# Patient Record
Sex: FEMALE | Race: BLACK OR AFRICAN AMERICAN | NOT HISPANIC OR LATINO | Employment: UNEMPLOYED | URBAN - METROPOLITAN AREA
[De-identification: names, ages, dates, MRNs, and addresses within clinical notes are randomized per-mention and may not be internally consistent; named-entity substitution may affect disease eponyms.]

---

## 2020-01-22 DIAGNOSIS — O09.522 MULTIGRAVIDA OF ADVANCED MATERNAL AGE IN SECOND TRIMESTER: Primary | ICD-10-CM

## 2020-01-30 ENCOUNTER — INITIAL CONSULT (OUTPATIENT)
Dept: MATERNAL FETAL MEDICINE | Facility: CLINIC | Age: 40
End: 2020-01-30
Payer: MEDICAID

## 2020-01-30 VITALS
RESPIRATION RATE: 20 BRPM | BODY MASS INDEX: 22.44 KG/M2 | OXYGEN SATURATION: 97 % | HEART RATE: 84 BPM | SYSTOLIC BLOOD PRESSURE: 120 MMHG | HEIGHT: 67 IN | DIASTOLIC BLOOD PRESSURE: 74 MMHG | WEIGHT: 143 LBS

## 2020-01-30 DIAGNOSIS — O09.522 MULTIGRAVIDA OF ADVANCED MATERNAL AGE IN SECOND TRIMESTER: ICD-10-CM

## 2020-01-30 PROCEDURE — 76811 OB US DETAILED SNGL FETUS: CPT | Mod: S$GLB,,, | Performed by: OBSTETRICS & GYNECOLOGY

## 2020-01-30 PROCEDURE — 99203 PR OFFICE/OUTPT VISIT, NEW, LEVL III, 30-44 MIN: ICD-10-PCS | Mod: TH,25,S$GLB, | Performed by: OBSTETRICS & GYNECOLOGY

## 2020-01-30 PROCEDURE — 76811 PR US, OB FETAL EVAL & EXAM, TRANSABDOM,FIRST GESTATION: ICD-10-PCS | Mod: S$GLB,,, | Performed by: OBSTETRICS & GYNECOLOGY

## 2020-01-30 PROCEDURE — 99203 OFFICE O/P NEW LOW 30 MIN: CPT | Mod: TH,25,S$GLB, | Performed by: OBSTETRICS & GYNECOLOGY

## 2020-01-30 RX ORDER — PROMETHAZINE HYDROCHLORIDE 25 MG/1
TABLET ORAL
COMMUNITY
Start: 2019-11-15 | End: 2023-07-10

## 2020-01-30 RX ORDER — FERROUS SULFATE 325(65) MG
TABLET, DELAYED RELEASE (ENTERIC COATED) ORAL
COMMUNITY
Start: 2019-11-27

## 2020-01-30 RX ORDER — B-COMPLEX WITH VITAMIN C
TABLET ORAL
COMMUNITY
Start: 2019-11-03 | End: 2023-07-10

## 2020-01-30 NOTE — PROGRESS NOTES
"Christ is here for initial M consultation, referred by Dr. Ortega for AMA.    She is feeling fetal movement.    Christ denies loss of fluid, recurrent cramping, nausea or vomiting. She did go to the hospital 4 days ago for vaginal bleeding and reports that "everything was fine."      Vitals:    01/30/20 0938   BP: 120/74   Pulse: 84   Resp: 20   SpO2: 97%   Weight: 64.9 kg (143 lb)   Height: 5' 7" (1.702 m)      BMI:                    22.4 kg/m^2             "

## 2020-01-30 NOTE — LETTER
January 30, 2020        Walker Ortega MD  [address] [facility] [address]             [facility]  [address]  [place] [address]  Phone: [phone]  Fax: [phone]   Patient: Christ Arambula   MR Number: [id number]   YOB: 1980   Date of Visit: 1/30/2020       Dear Dr. Ortega:    Thank you for referring Christ Arambula to me for evaluation. Attached you will find relevant portions of my assessment and plan of care.    If you have questions, please do not hesitate to call me. I look forward to following Christ Arambula along with you.    Sincerely,      Pernell Abad MD        CC  No Recipients    Enclosure

## 2020-01-30 NOTE — PROGRESS NOTES
Indication for consultation:  Advanced maternal age    Provider requesting consultation: Dr. Ortega    Dear Dr. Ortega,    I had the pleasure of seeing Christ Arambula for initial consultation today.  As you recall she is a 39 y.o.  at 18w0d here for consultation regarding advanced maternal age.    Of note the patient has completed quad screening with your office.  Her age related risk of Down syndrome is 1 in 100 fortunately her quad screening notes her risk to be much less at 1 in 2937.  Her quad screen does not show any increased risk of trisomy 18 or open neural tube defect.    PMH:  The patient has a history of asthma.  She is currently taking no medications for this diagnosis.  She also has remote history of pancreatitis in 2017.    Ob Hx:  This is the patient's 4th pregnancy. Her 1st pregnancy was a vaginal delivery at 39 weeks of a 6 lb 4 oz female  in .  Her 2nd pregnancy was a successful vaginal delivery at 39 weeks of a 6 lb 9 oz female  in .  Her 3rd pregnancy was a first-trimester miscarriage in .    PSH:  She denies any prior surgeries.    SOC:  The patient has a known history of tobacco and marijuana use.  According to the patient she is trying to decrease her tobacco usage.  She has not smoked marijuana in over a month.  She denies any alcohol use in pregnancy.    Medications:  Phenergan 25 mg as needed for nausea vomiting.    Family hx:  The patient has a father who  from Luz Maria's disease.  There is also a family history of schizophrenia.    Allergies:  She is allergic to Cheratussin AC    Review of systems: The patient denies any vaginal bleeding, loss of fluid or contraction pain today.  Vitals:    20 0938   BP: 120/74   Pulse: 84   Resp: 20   Weight: 143lb    Physical exam:  Gen: WDWN in NAD  HEENT: WNL  Abdomen: Soft, non-tender  Skin: No rash or jaundice  Extremities: No clubbing or cyanosis  Neuro: Grossly intact    Ultrasound:  A detailed fetal  anatomical survey was completed today.  There is a single intrauterine pregnancy in cephalic presentation.  Structurally the fetus does not have any evidence of fetal aneuploidy.  The fetal growth is reassuring.  The amniotic fluid volume appears to be normal. The cervix appears to be of normal length.  Placenta does not show any evidence of previa.  There is a 6 cm fibroid in the left lower quadrant that does not show any evidence pelvic obstruction.  Please see official report for further specifics.    Recommendations:  Today I discussed with the patient the increased risk of fetal aneuploidy with advancing maternal age.  I reviewed with the patient the results of her ultrasound along with the results of her quad screening.  I discussed with the patient the limitations of ultrasound in the diagnosis of fetal aneuploidy.  I also reviewed with the patient the definitive diagnostic option of amniocentesis, including its fetal loss rate of 1 in 500.  At the end of our conversation today I did not recommend amniocentesis since her ultrasound appears normal and her quad screening is negative.    I also discussed with the patient the finding of an intramural fibroid.  These are quite common in pregnancy as you know.  Occasionally we will have a fibroid that will began to have degeneration secondary to rapid growth.  In those women experiencing pain from the fibroid firm rapid growth/degeneration I do recommend a course ibuprofen prior to 32 weeks.  Beyond 32 weeks I would treat degenerating fibroids with just Tylenol and occasionally opioids.      After today's visit since everything appears normal on fetal ultrasound I did not schedule any further follow-up ultrasounds.    Thanks once again for allowing us to participate in the care of your patients.  If you have any questions about today's consultation feel free to contact me or my partners at [phone].    Sincerely,

## 2023-03-24 ENCOUNTER — HOSPITAL ENCOUNTER (EMERGENCY)
Facility: HOSPITAL | Age: 43
Discharge: HOME | End: 2023-03-24
Attending: FAMILY MEDICINE

## 2023-03-24 VITALS
RESPIRATION RATE: 16 BRPM | HEIGHT: 67 IN | TEMPERATURE: 98 F | HEART RATE: 83 BPM | OXYGEN SATURATION: 100 % | WEIGHT: 130.06 LBS | BODY MASS INDEX: 20.41 KG/M2 | SYSTOLIC BLOOD PRESSURE: 163 MMHG | DIASTOLIC BLOOD PRESSURE: 91 MMHG

## 2023-03-24 DIAGNOSIS — K08.89 PAIN, DENTAL: Primary | ICD-10-CM

## 2023-03-24 PROCEDURE — 64400 NJX AA&/STRD TRIGEMINAL NRV: CPT

## 2023-03-24 PROCEDURE — 99284 EMERGENCY DEPT VISIT MOD MDM: CPT

## 2023-03-24 NOTE — ED PROVIDER NOTES
Name: Christ Arambula   Age: 42 y.o.  Sex: female    Chief complaint:   Chief Complaint   Patient presents with    Dental Pain     PT CO DENTAL PAIN X 3 DAYS. STARTED RX ANTIBIOTIC YESTERDAY, PAIN MEDS NOT HELPING.       Patient arrived with: Private  History obtained from: Patient    Subjective:     42-year-old female with a history of asthma and pancreatitis that presents to emergency department for dental pain.  Patient said she is had dental pain that has been going on for few days.  She saw a dentist yesterday and was told she had periodontal disease and signs of an abscess.  She was sent home with amoxicillin and tramadol.   They have a follow-up appointment scheduled with the dentist on April 12th for possible root canal.  Had worsening pain overnight which is why she came into the emergency department.  Tolerating p.o. intake.  Denies fever, chills, sweats, cough, sore throat, chest pain, shortness of breath, abdominal pain, vomiting, diarrhea, urinary symptoms.    Past Medical History:   Diagnosis Date    Asthma     Pancreatitis 2017     Past Surgical History:   Procedure Laterality Date    NO PAST SURGERIES       Social History     Socioeconomic History    Marital status: Single   Tobacco Use    Smoking status: Some Days    Tobacco comments:     occasional cigarette   Substance and Sexual Activity    Alcohol use: Not Currently    Drug use: Not Currently     Types: Marijuana     Comment: last used early Jan 2020    Sexual activity: Yes     Partners: Male   Social History Narrative    ** Merged History Encounter **          Review of patient's allergies indicates:   Allergen Reactions    Cheratussin ac [codeine-guaifenesin] Itching        Review of Systems   Constitutional:  Negative for diaphoresis and fever.   HENT:  Negative for congestion and sore throat.    Eyes:  Negative for pain and discharge.   Respiratory:  Negative for cough and shortness of breath.    Cardiovascular:  Negative for chest pain  and palpitations.   Gastrointestinal:  Negative for diarrhea and vomiting.   Genitourinary:  Negative for dysuria and hematuria.   Musculoskeletal:  Negative for back pain and myalgias.   Skin:  Negative for itching and rash.   Neurological:  Negative for weakness and headaches.        Objective:     Vitals:    03/24/23 0752   BP: (!) 163/91   Pulse: 83   Resp: 16   Temp: 97.9 °F (36.6 °C)        Physical Exam  Constitutional:       Appearance: She is not toxic-appearing.   HENT:      Head: Normocephalic and atraumatic.      Mouth/Throat:      Mouth: Mucous membranes are dry.      Dentition: Abnormal dentition. Dental caries present. No dental tenderness.   Cardiovascular:      Rate and Rhythm: Regular rhythm.      Pulses: Normal pulses.   Pulmonary:      Effort: Pulmonary effort is normal.      Breath sounds: Normal breath sounds.   Abdominal:      General: Abdomen is flat. Bowel sounds are normal.      Palpations: Abdomen is soft.   Musculoskeletal:         General: No deformity. Normal range of motion.      Cervical back: Normal range of motion and neck supple.   Skin:     General: Skin is warm and dry.   Neurological:      General: No focal deficit present.      Mental Status: She is alert and oriented to person, place, and time.   Psychiatric:         Mood and Affect: Mood normal.         Behavior: Behavior normal.        Records:  Nursing records and triage records reviewed  Prior records reviewed    Medical decision making:     Presents to emergency department for dental pain, is already seeing a dentist at this time and on antibiotics.  Patient is nontoxic appearing.  Physical exam showed no acute findings.  Dental block was performed and patient's pain improved.  She will continue follow-up with a dentist for re-evaluation.  We discussed pain management.  We discussed return precautions they are listed in the discharge instructions.  Patient understands the plan, no further questions, felt safe for  discharge.            EKG:       Nerve Block    Date/Time: 3/24/2023 8:32 AM  Location procedure was performed: [facility] EMERGENCY DEPARTMENT  Performed by: Dave Crawley MD  Authorized by: Dave Crawley MD   Consent Done: Yes  Consent: Verbal consent obtained.  Consent given by: patient  Patient understanding: patient states understanding of the procedure being performed  Indications: pain relief  Body area: face/mouth  Nerve: inferior alveolar  Laterality: left  Location technique: anatomical landmarks  Local Anesthetic: bupivacaine 0.5% with epinephrine  Anesthetic total: 1.8 mL  Complications: No  Specimens: No  Implants: No  Outcome: pain improved  Patient tolerance: Patient tolerated the procedure well with no immediate complications           Diagnosis:  Final diagnoses:  [K08.89] Pain, dental (Primary)     ED Prescriptions    None       Follow-up Information       Follow up With Specialties Details Why Contact Info    Dentist  Call  As soon as possible             Dave Crawley M.D.  Emergency Medicine Physician     (Please note that this chart was completed via voice to text dictation. There may be typographical errors or substitutions that are unintentional, or uncorrected. Every attempt was made to proofread the chart prior to completion. If there are any questions, please contact the physician for final clarification).         Dave Crawley MD  03/24/23 0896

## 2023-03-24 NOTE — DISCHARGE INSTRUCTIONS
You came into the emergency department for dental pain.    We did a nerve block that help you with your dental pain.  Continue taking you antibiotics.      You can take Tylenol and ibuprofen for pain control.      Please follow-up with the dentist for re-evaluation as scheduled.    Please note that a dentist is the only one that can take care of your current issues and will be really important for you to see them as soon as possible.    Return to the emergency department he started to have fevers that are not under control, not able tolerate any food/water, start to have worsening swelling of your gums or face, start to notice pus-like discharge.

## 2023-03-24 NOTE — Clinical Note
"Christ Abdul (Shalania) was seen and treated in our emergency department on 3/24/2023.  She may return to school on 03/24/2023.  Socorro abdul was in ER this am.     If you have any questions or concerns, please don't hesitate to call.       RN"

## 2023-05-24 ENCOUNTER — HOSPITAL ENCOUNTER (EMERGENCY)
Facility: HOSPITAL | Age: 43
Discharge: HOME | End: 2023-05-24
Attending: STUDENT IN AN ORGANIZED HEALTH CARE EDUCATION/TRAINING PROGRAM
Payer: MEDICAID

## 2023-05-24 VITALS
HEART RATE: 77 BPM | SYSTOLIC BLOOD PRESSURE: 147 MMHG | WEIGHT: 127.88 LBS | RESPIRATION RATE: 20 BRPM | BODY MASS INDEX: 20.07 KG/M2 | DIASTOLIC BLOOD PRESSURE: 83 MMHG | TEMPERATURE: 97 F | HEIGHT: 67 IN | OXYGEN SATURATION: 100 %

## 2023-05-24 DIAGNOSIS — Z76.0 MEDICATION REFILL: ICD-10-CM

## 2023-05-24 DIAGNOSIS — M54.41 CHRONIC RIGHT-SIDED LOW BACK PAIN WITH RIGHT-SIDED SCIATICA: Primary | ICD-10-CM

## 2023-05-24 DIAGNOSIS — G89.29 CHRONIC RIGHT-SIDED LOW BACK PAIN WITH RIGHT-SIDED SCIATICA: Primary | ICD-10-CM

## 2023-05-24 LAB
B-HCG UR QL: NEGATIVE
CTP QC/QA: YES

## 2023-05-24 PROCEDURE — 81025 URINE PREGNANCY TEST: CPT | Performed by: NURSE PRACTITIONER

## 2023-05-24 PROCEDURE — 63600175 PHARM REV CODE 636 W HCPCS: Performed by: NURSE PRACTITIONER

## 2023-05-24 PROCEDURE — 96372 THER/PROPH/DIAG INJ SC/IM: CPT | Performed by: NURSE PRACTITIONER

## 2023-05-24 PROCEDURE — 99284 EMERGENCY DEPT VISIT MOD MDM: CPT

## 2023-05-24 RX ORDER — CYPROHEPTADINE HYDROCHLORIDE 4 MG/1
4 TABLET ORAL 2 TIMES DAILY
Qty: 60 TABLET | Refills: 0 | Status: SHIPPED | OUTPATIENT
Start: 2023-05-24 | End: 2023-07-10 | Stop reason: SDUPTHER

## 2023-05-24 RX ORDER — DICLOFENAC SODIUM 75 MG/1
75 TABLET, DELAYED RELEASE ORAL 2 TIMES DAILY PRN
Qty: 20 TABLET | Refills: 0 | Status: SHIPPED | OUTPATIENT
Start: 2023-05-24 | End: 2023-07-10

## 2023-05-24 RX ORDER — KETOROLAC TROMETHAMINE 30 MG/ML
30 INJECTION, SOLUTION INTRAMUSCULAR; INTRAVENOUS
Status: COMPLETED | OUTPATIENT
Start: 2023-05-24 | End: 2023-05-24

## 2023-05-24 RX ORDER — TIZANIDINE 4 MG/1
8 TABLET ORAL EVERY 6 HOURS PRN
Qty: 30 TABLET | Refills: 0 | Status: SHIPPED | OUTPATIENT
Start: 2023-05-24 | End: 2023-06-03

## 2023-05-24 RX ADMIN — KETOROLAC TROMETHAMINE 30 MG: 30 INJECTION, SOLUTION INTRAMUSCULAR; INTRAVENOUS at 09:05
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
## 2023-07-10 ENCOUNTER — PATIENT MESSAGE (OUTPATIENT)
Dept: ADMINISTRATIVE | Facility: HOSPITAL | Age: 43
End: 2023-07-10
Payer: MEDICAID

## 2023-07-10 ENCOUNTER — OFFICE VISIT (OUTPATIENT)
Dept: INTERNAL MEDICINE | Facility: CLINIC | Age: 43
End: 2023-07-10
Payer: MEDICAID

## 2023-07-10 VITALS
TEMPERATURE: 99 F | RESPIRATION RATE: 20 BRPM | HEART RATE: 60 BPM | HEIGHT: 67 IN | BODY MASS INDEX: 22.07 KG/M2 | SYSTOLIC BLOOD PRESSURE: 128 MMHG | DIASTOLIC BLOOD PRESSURE: 80 MMHG | WEIGHT: 140.63 LBS

## 2023-07-10 DIAGNOSIS — F41.9 ANXIETY AND DEPRESSION: ICD-10-CM

## 2023-07-10 DIAGNOSIS — F17.200 SMOKER: ICD-10-CM

## 2023-07-10 DIAGNOSIS — Z00.00 WELLNESS EXAMINATION: Primary | ICD-10-CM

## 2023-07-10 DIAGNOSIS — R51.9 NONINTRACTABLE HEADACHE, UNSPECIFIED CHRONICITY PATTERN, UNSPECIFIED HEADACHE TYPE: ICD-10-CM

## 2023-07-10 DIAGNOSIS — R63.0 DECREASED APPETITE: ICD-10-CM

## 2023-07-10 DIAGNOSIS — Z82.0 FAMILY HISTORY OF HUNTINGTON'S DISEASE: ICD-10-CM

## 2023-07-10 DIAGNOSIS — J45.909 ASTHMA, UNSPECIFIED ASTHMA SEVERITY, UNSPECIFIED WHETHER COMPLICATED, UNSPECIFIED WHETHER PERSISTENT: ICD-10-CM

## 2023-07-10 DIAGNOSIS — F32.A ANXIETY AND DEPRESSION: ICD-10-CM

## 2023-07-10 DIAGNOSIS — Z11.3 ROUTINE SCREENING FOR STI (SEXUALLY TRANSMITTED INFECTION): ICD-10-CM

## 2023-07-10 DIAGNOSIS — N89.8 VAGINAL DISCHARGE: ICD-10-CM

## 2023-07-10 DIAGNOSIS — Z13.1 SCREENING FOR DIABETES MELLITUS: ICD-10-CM

## 2023-07-10 DIAGNOSIS — Z12.31 ENCOUNTER FOR SCREENING MAMMOGRAM FOR MALIGNANT NEOPLASM OF BREAST: ICD-10-CM

## 2023-07-10 LAB
CLUE CELLS VAG QL WET PREP: NORMAL
T VAGINALIS VAG QL WET PREP: NORMAL
WBC #/AREA VAG WET PREP: NORMAL
YEAST SPEC QL WET PREP: NORMAL

## 2023-07-10 PROCEDURE — 3008F PR BODY MASS INDEX (BMI) DOCUMENTED: ICD-10-PCS | Mod: CPTII,,, | Performed by: NURSE PRACTITIONER

## 2023-07-10 PROCEDURE — 99406 BEHAV CHNG SMOKING 3-10 MIN: CPT | Mod: S$PBB,,, | Performed by: NURSE PRACTITIONER

## 2023-07-10 PROCEDURE — 3074F SYST BP LT 130 MM HG: CPT | Mod: CPTII,,, | Performed by: NURSE PRACTITIONER

## 2023-07-10 PROCEDURE — 99215 OFFICE O/P EST HI 40 MIN: CPT | Mod: PBBFAC | Performed by: NURSE PRACTITIONER

## 2023-07-10 PROCEDURE — 3008F BODY MASS INDEX DOCD: CPT | Mod: CPTII,,, | Performed by: NURSE PRACTITIONER

## 2023-07-10 PROCEDURE — 3074F PR MOST RECENT SYSTOLIC BLOOD PRESSURE < 130 MM HG: ICD-10-PCS | Mod: CPTII,,, | Performed by: NURSE PRACTITIONER

## 2023-07-10 PROCEDURE — 99205 OFFICE O/P NEW HI 60 MIN: CPT | Mod: S$PBB,25,, | Performed by: NURSE PRACTITIONER

## 2023-07-10 PROCEDURE — 99406 PR TOBACCO USE CESSATION INTERMEDIATE 3-10 MINUTES: ICD-10-PCS | Mod: S$PBB,,, | Performed by: NURSE PRACTITIONER

## 2023-07-10 PROCEDURE — 87210 SMEAR WET MOUNT SALINE/INK: CPT | Performed by: NURSE PRACTITIONER

## 2023-07-10 PROCEDURE — 1159F PR MEDICATION LIST DOCUMENTED IN MEDICAL RECORD: ICD-10-PCS | Mod: CPTII,,, | Performed by: NURSE PRACTITIONER

## 2023-07-10 PROCEDURE — 1160F PR REVIEW ALL MEDS BY PRESCRIBER/CLIN PHARMACIST DOCUMENTED: ICD-10-PCS | Mod: CPTII,,, | Performed by: NURSE PRACTITIONER

## 2023-07-10 PROCEDURE — 3079F DIAST BP 80-89 MM HG: CPT | Mod: CPTII,,, | Performed by: NURSE PRACTITIONER

## 2023-07-10 PROCEDURE — 99205 PR OFFICE/OUTPT VISIT, NEW, LEVL V, 60-74 MIN: ICD-10-PCS | Mod: S$PBB,25,, | Performed by: NURSE PRACTITIONER

## 2023-07-10 PROCEDURE — 1160F RVW MEDS BY RX/DR IN RCRD: CPT | Mod: CPTII,,, | Performed by: NURSE PRACTITIONER

## 2023-07-10 PROCEDURE — 3079F PR MOST RECENT DIASTOLIC BLOOD PRESSURE 80-89 MM HG: ICD-10-PCS | Mod: CPTII,,, | Performed by: NURSE PRACTITIONER

## 2023-07-10 PROCEDURE — 1159F MED LIST DOCD IN RCRD: CPT | Mod: CPTII,,, | Performed by: NURSE PRACTITIONER

## 2023-07-10 RX ORDER — CYPROHEPTADINE HYDROCHLORIDE 4 MG/1
4 TABLET ORAL 3 TIMES DAILY PRN
COMMUNITY
End: 2023-07-10 | Stop reason: SDUPTHER

## 2023-07-10 RX ORDER — CYPROHEPTADINE HYDROCHLORIDE 4 MG/1
4 TABLET ORAL 3 TIMES DAILY PRN
Qty: 90 TABLET | Refills: 1 | Status: SHIPPED | OUTPATIENT
Start: 2023-07-10 | End: 2024-03-05 | Stop reason: SDUPTHER

## 2023-07-10 RX ORDER — MULTIVITAMIN
1 TABLET ORAL DAILY
COMMUNITY
End: 2023-08-21

## 2023-07-10 RX ORDER — ALBUTEROL SULFATE 90 UG/1
2 AEROSOL, METERED RESPIRATORY (INHALATION) EVERY 6 HOURS PRN
Qty: 18 G | Refills: 0 | Status: SHIPPED | OUTPATIENT
Start: 2023-07-10 | End: 2024-07-09

## 2023-07-10 NOTE — ASSESSMENT & PLAN NOTE
1. Drink 6-8 glasses of water/day  2. Wipe from front to back after urinating  3. Avoid use of scented soaps, lotions, perfumes, etc in vaginal region  4. Practice safe sex
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
## 2023-07-10 NOTE — TELEPHONE ENCOUNTER
Please inform of results:  WBC, Wet Prep None Seen None Seen    Clue Cells, Wet Prep None Seen None Seen    Trichomonas, Wet Prep None Seen None Seen    Yeast, Wet Prep None Seen None Seen       Latest Reference Range & Units 07/10/23 12:05   Chlamydia trachomatis PCR Not Detected  Not Detected   N. gonorrhea PCR Not Detected  Not Detected      Latest Reference Range & Units 07/10/23 11:53   Hepatitis C Ab Nonreactive  Nonreactive   HIV Nonreactive  Nonreactive   Syphilis Antibody Nonreactive, Equivocal  Nonreactive     So far, testing negative. Discharge could be due to very recent menses.     I'd advise:  1. Drink 6-8 glasses of water/day  2. Wipe from front to back after urinating  3. Avoid use of scented soaps, lotions, perfumes, etc in vaginal region    If continues, have her schedule an appt.

## 2023-07-10 NOTE — ASSESSMENT & PLAN NOTE
States intermittent since orbital fx in 2016. Recently missed an appt with a  Neurologist. Advised to f/u ASAP

## 2023-07-21 ENCOUNTER — HOSPITAL ENCOUNTER (OUTPATIENT)
Dept: RADIOLOGY | Facility: HOSPITAL | Age: 43
Discharge: HOME | End: 2023-07-21
Attending: NURSE PRACTITIONER
Payer: MEDICAID

## 2023-07-21 DIAGNOSIS — Z12.31 ENCOUNTER FOR SCREENING MAMMOGRAM FOR MALIGNANT NEOPLASM OF BREAST: ICD-10-CM

## 2023-07-21 PROCEDURE — 77063 MAMMO DIGITAL SCREENING BILAT WITH TOMO: ICD-10-PCS | Mod: 26,,, | Performed by: RADIOLOGY

## 2023-07-21 PROCEDURE — 77067 MAMMO DIGITAL SCREENING BILAT WITH TOMO: ICD-10-PCS | Mod: 26,,, | Performed by: RADIOLOGY

## 2023-07-21 PROCEDURE — 77063 BREAST TOMOSYNTHESIS BI: CPT | Mod: 26,,, | Performed by: RADIOLOGY

## 2023-07-21 PROCEDURE — 77067 SCR MAMMO BI INCL CAD: CPT | Mod: 26,,, | Performed by: RADIOLOGY

## 2023-07-21 PROCEDURE — 77067 SCR MAMMO BI INCL CAD: CPT | Mod: TC

## 2023-08-02 ENCOUNTER — HOSPITAL ENCOUNTER (EMERGENCY)
Facility: HOSPITAL | Age: 43
Discharge: HOME | End: 2023-08-02
Attending: STUDENT IN AN ORGANIZED HEALTH CARE EDUCATION/TRAINING PROGRAM
Payer: MEDICAID

## 2023-08-02 VITALS
DIASTOLIC BLOOD PRESSURE: 77 MMHG | BODY MASS INDEX: 22.1 KG/M2 | HEART RATE: 65 BPM | OXYGEN SATURATION: 100 % | TEMPERATURE: 99 F | WEIGHT: 141.13 LBS | RESPIRATION RATE: 24 BRPM | SYSTOLIC BLOOD PRESSURE: 118 MMHG

## 2023-08-02 DIAGNOSIS — R05.9 COUGH: ICD-10-CM

## 2023-08-02 DIAGNOSIS — R42 POSTURAL DIZZINESS WITH PRESYNCOPE: ICD-10-CM

## 2023-08-02 DIAGNOSIS — R07.9 CHEST PAIN: ICD-10-CM

## 2023-08-02 DIAGNOSIS — R55 POSTURAL DIZZINESS WITH PRESYNCOPE: ICD-10-CM

## 2023-08-02 LAB
ALBUMIN SERPL-MCNC: 3.9 G/DL (ref 3.5–5)
ALBUMIN/GLOB SERPL: 1.1 RATIO (ref 1.1–2)
ALP SERPL-CCNC: 53 UNIT/L (ref 40–150)
ALT SERPL-CCNC: 7 UNIT/L (ref 0–55)
APPEARANCE UR: CLEAR
AST SERPL-CCNC: 13 UNIT/L (ref 5–34)
BACTERIA #/AREA URNS AUTO: ABNORMAL /HPF
BASOPHILS # BLD AUTO: 0.02 X10(3)/MCL
BASOPHILS NFR BLD AUTO: 0.3 %
BILIRUB UR QL STRIP.AUTO: NEGATIVE
BILIRUBIN DIRECT+TOT PNL SERPL-MCNC: 0.3 MG/DL
BUN SERPL-MCNC: 10.3 MG/DL (ref 7–18.7)
CALCIUM SERPL-MCNC: 9.2 MG/DL (ref 8.4–10.2)
CHLORIDE SERPL-SCNC: 103 MMOL/L (ref 98–107)
CO2 SERPL-SCNC: 28 MMOL/L (ref 22–29)
COLOR UR: ABNORMAL
CREAT SERPL-MCNC: 0.85 MG/DL (ref 0.55–1.02)
EOSINOPHIL # BLD AUTO: 0.12 X10(3)/MCL (ref 0–0.9)
EOSINOPHIL NFR BLD AUTO: 2 %
ERYTHROCYTE [DISTWIDTH] IN BLOOD BY AUTOMATED COUNT: 12.4 % (ref 11.5–17)
FLUAV AG UPPER RESP QL IA.RAPID: NOT DETECTED
FLUBV AG UPPER RESP QL IA.RAPID: NOT DETECTED
GFR SERPLBLD CREATININE-BSD FMLA CKD-EPI: >60 MLS/MIN/1.73/M2
GLOBULIN SER-MCNC: 3.5 GM/DL (ref 2.4–3.5)
GLUCOSE SERPL-MCNC: 83 MG/DL (ref 74–100)
GLUCOSE UR QL STRIP.AUTO: NORMAL
HCT VFR BLD AUTO: 36.4 % (ref 37–47)
HGB BLD-MCNC: 12 G/DL (ref 12–16)
HYALINE CASTS #/AREA URNS LPF: ABNORMAL /LPF
IMM GRANULOCYTES # BLD AUTO: 0.01 X10(3)/MCL (ref 0–0.04)
IMM GRANULOCYTES NFR BLD AUTO: 0.2 %
KETONES UR QL STRIP.AUTO: NEGATIVE
LEUKOCYTE ESTERASE UR QL STRIP.AUTO: NEGATIVE
LYMPHOCYTES # BLD AUTO: 1.75 X10(3)/MCL (ref 0.6–4.6)
LYMPHOCYTES NFR BLD AUTO: 29.3 %
MCH RBC QN AUTO: 31.5 PG (ref 27–31)
MCHC RBC AUTO-ENTMCNC: 33 G/DL (ref 33–36)
MCV RBC AUTO: 95.5 FL (ref 80–94)
MONOCYTES # BLD AUTO: 0.37 X10(3)/MCL (ref 0.1–1.3)
MONOCYTES NFR BLD AUTO: 6.2 %
MUCOUS THREADS URNS QL MICRO: ABNORMAL /LPF
NEUTROPHILS # BLD AUTO: 3.7 X10(3)/MCL (ref 2.1–9.2)
NEUTROPHILS NFR BLD AUTO: 62 %
NITRITE UR QL STRIP.AUTO: NEGATIVE
NRBC BLD AUTO-RTO: 0 %
PH UR STRIP.AUTO: 6.5 [PH]
PLATELET # BLD AUTO: 276 X10(3)/MCL (ref 130–400)
PMV BLD AUTO: 9.9 FL (ref 7.4–10.4)
POTASSIUM SERPL-SCNC: 4 MMOL/L (ref 3.5–5.1)
PROT SERPL-MCNC: 7.4 GM/DL (ref 6.4–8.3)
PROT UR QL STRIP.AUTO: NEGATIVE
RBC # BLD AUTO: 3.81 X10(6)/MCL (ref 4.2–5.4)
RBC #/AREA URNS AUTO: ABNORMAL /HPF
RBC UR QL AUTO: NEGATIVE
SARS-COV-2 RNA RESP QL NAA+PROBE: NOT DETECTED
SODIUM SERPL-SCNC: 137 MMOL/L (ref 136–145)
SP GR UR STRIP.AUTO: 1.02
SQUAMOUS #/AREA URNS LPF: ABNORMAL /HPF
STREP A PCR (OHS): NOT DETECTED
TROPONIN I SERPL-MCNC: <0.01 NG/ML (ref 0–0.04)
UROBILINOGEN UR STRIP-ACNC: NORMAL
WBC # SPEC AUTO: 5.97 X10(3)/MCL (ref 4.5–11.5)
WBC #/AREA URNS AUTO: ABNORMAL /HPF

## 2023-08-02 PROCEDURE — 25000003 PHARM REV CODE 250: Performed by: NURSE PRACTITIONER

## 2023-08-02 PROCEDURE — 0240U COVID/FLU A&B PCR: CPT | Performed by: NURSE PRACTITIONER

## 2023-08-02 PROCEDURE — 99285 EMERGENCY DEPT VISIT HI MDM: CPT | Mod: 25

## 2023-08-02 PROCEDURE — 87651 STREP A DNA AMP PROBE: CPT | Performed by: NURSE PRACTITIONER

## 2023-08-02 PROCEDURE — 81001 URINALYSIS AUTO W/SCOPE: CPT | Performed by: NURSE PRACTITIONER

## 2023-08-02 PROCEDURE — 80053 COMPREHEN METABOLIC PANEL: CPT | Performed by: NURSE PRACTITIONER

## 2023-08-02 PROCEDURE — 84484 ASSAY OF TROPONIN QUANT: CPT | Performed by: NURSE PRACTITIONER

## 2023-08-02 PROCEDURE — 93005 ELECTROCARDIOGRAM TRACING: CPT

## 2023-08-02 PROCEDURE — 85025 COMPLETE CBC W/AUTO DIFF WBC: CPT | Performed by: NURSE PRACTITIONER

## 2023-08-02 PROCEDURE — 96360 HYDRATION IV INFUSION INIT: CPT

## 2023-08-02 RX ORDER — MECLIZINE HYDROCHLORIDE 25 MG/1
25 TABLET ORAL 3 TIMES DAILY PRN
Qty: 21 TABLET | Refills: 0 | Status: SHIPPED | OUTPATIENT
Start: 2023-08-02

## 2023-08-02 RX ADMIN — SODIUM CHLORIDE 1000 ML: 9 INJECTION, SOLUTION INTRAVENOUS at 12:08

## 2023-08-02 NOTE — DISCHARGE INSTRUCTIONS
Follow up with your primary care physician in 3-5 days for follow up evaluation.  Follow up with [facility] as discussed in the ED.  Take medication as prescribed.
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
## 2023-08-02 NOTE — Clinical Note
"Christ Arambula (Shalania) was seen and treated in our emergency department on 8/2/2023.  She may return to work on 08/03/2023.       If you have any questions or concerns, please don't hesitate to call.       RN    "

## 2023-08-03 LAB — POCT GLUCOSE: 79 MG/DL (ref 70–110)

## 2023-08-04 ENCOUNTER — TELEPHONE (OUTPATIENT)
Dept: INTERNAL MEDICINE | Facility: CLINIC | Age: 43
End: 2023-08-04
Payer: MEDICAID

## 2023-08-04 NOTE — TELEPHONE ENCOUNTER
----- Message from MARYSE Mayes sent at 8/4/2023  7:20 AM CDT -----  Normal MMG. Repeat in 1 year.

## 2023-08-11 ENCOUNTER — OFFICE VISIT (OUTPATIENT)
Dept: CARDIOLOGY | Facility: CLINIC | Age: 43
End: 2023-08-11
Payer: MEDICAID

## 2023-08-11 VITALS
BODY MASS INDEX: 21.45 KG/M2 | OXYGEN SATURATION: 100 % | HEART RATE: 70 BPM | HEIGHT: 67 IN | DIASTOLIC BLOOD PRESSURE: 82 MMHG | RESPIRATION RATE: 16 BRPM | SYSTOLIC BLOOD PRESSURE: 113 MMHG | WEIGHT: 136.69 LBS

## 2023-08-11 DIAGNOSIS — J45.909 ASTHMA, UNSPECIFIED ASTHMA SEVERITY, UNSPECIFIED WHETHER COMPLICATED, UNSPECIFIED WHETHER PERSISTENT: Primary | ICD-10-CM

## 2023-08-11 DIAGNOSIS — R42 POSTURAL DIZZINESS WITH PRESYNCOPE: ICD-10-CM

## 2023-08-11 DIAGNOSIS — R07.89 CHEST DISCOMFORT: ICD-10-CM

## 2023-08-11 DIAGNOSIS — R55 POSTURAL DIZZINESS WITH PRESYNCOPE: ICD-10-CM

## 2023-08-11 PROCEDURE — 99214 OFFICE O/P EST MOD 30 MIN: CPT | Mod: PBBFAC | Performed by: INTERNAL MEDICINE

## 2023-08-11 RX ORDER — NITROGLYCERIN 0.4 MG/1
0.4 TABLET SUBLINGUAL EVERY 5 MIN PRN
Qty: 30 TABLET | Refills: 3 | Status: SHIPPED | OUTPATIENT
Start: 2023-08-11 | End: 2024-08-10

## 2023-08-11 NOTE — PATIENT INSTRUCTIONS
3 month follow up with 48 hour Holter monitor, and stress echo. Start nitro as needed for chest pain.

## 2023-08-11 NOTE — PROGRESS NOTES
08/11/2023 7:59 AM    Subjective:     CHIEF COMPLAINT:   Chief Complaint   Patient presents with    Initial visit     Here with C/O dizziness, presyncope, fatigue, chest pain with anxiety and activity, SOB.                         HPI:    Ms. Christ Arambula is a 42 y.o. female with a past medical history positive for anxiety, depression, and asthma, who was referred to Cardiology Clinic for recent episode of chest pain and with presyncopal symptoms of palpitations, chest tightness, eye crossing/blurry vision, and lightheadedness/dizziness.  Patient states the chest pain is dull, aching in nature and can be felt throughout the entire chest cavity.  She indicates the chest pain is constant and present daily through throughout the day with no alleviation.  Patient also notes that she has been using her albuterol inhaler daily approximates per day and has had associated mild shortness of breath daily as well.  She also states that presyncopal symptoms occur daily approximately twice per day and notices the symptoms occur more often after exertion or activity.  Patient denies loc/syncope or seizure activity during these episodes. EKG (8/2/2023) showed regular rate, sinus rhythm, with a right bundle branch block.    CP:  The patient has chest discomfort intermittently during episodes    SOB:  The patient denies shortness of breath No MIRAMONTES    EDEMA:  The patient denies edema.      ORTHOPNEA:  The patient denies orthopnea.  No PND.      SYNCOPE:  The patient denies near syncope.  No syncope.   No dizziness.    PALPITATIONS:  The patient has palpitations intermittently during episodes.    LEVEL OF EXERTION:  The patient works and has symptoms with this level of exertion.  The patient's level of exertion is adequate.    The patient denies recent cardiac testing.     Past Medical History    Patient Active Problem List   Diagnosis    Smoker    Wellness examination    Decreased appetite    Headache    Vaginal discharge     Family history of Edroy's disease    Anxiety and depression    Asthma    Postural dizziness with presyncope    Chest discomfort       Surgical History    Past Surgical History:   Procedure Laterality Date    NO PAST SURGERIES         Social History    Social History     Socioeconomic History    Marital status: Single   Tobacco Use    Smoking status: Every Day     Current packs/day: 0.00     Average packs/day: 1 pack/day for 25.8 years (25.8 ttl pk-yrs)     Types: Cigarettes     Start date: 1997     Last attempt to quit: 2023     Years since quittin.0     Passive exposure: Never    Tobacco comments:     I smoke  to much wanna stop but its hard.   Substance and Sexual Activity    Alcohol use: Not Currently     Comment: Occasionally    Drug use: Never     Types: Marijuana     Comment: last used early 2020    Sexual activity: Not Currently     Partners: Male     Birth control/protection: Condom   Social History Narrative    ** Merged History Encounter **          Social Determinants of Health     Financial Resource Strain: Low Risk  (7/10/2023)    Overall Financial Resource Strain (CARDIA)     Difficulty of Paying Living Expenses: Not hard at all   Food Insecurity: No Food Insecurity (7/10/2023)    Hunger Vital Sign     Worried About Running Out of Food in the Last Year: Never true     Ran Out of Food in the Last Year: Never true   Transportation Needs: No Transportation Needs (7/10/2023)    PRAPARE - Transportation     Lack of Transportation (Medical): No     Lack of Transportation (Non-Medical): No   Physical Activity: Inactive (7/10/2023)    Exercise Vital Sign     Days of Exercise per Week: 0 days     Minutes of Exercise per Session: 0 min   Stress: No Stress Concern Present (7/10/2023)    [employer] [place] [employer] - Occupational Stress Questionnaire     Feeling of Stress : Not at all   Social Connections: Moderately Isolated (7/10/2023)    Social Connection and Isolation Panel  [NHANES]     Frequency of Communication with Friends and Family: More than three times a week     Frequency of Social Gatherings with Friends and Family: Never     Attends Yarsanism Services: More than 4 times per year     Active Member of Clubs or Organizations: No     Attends Club or Organization Meetings: Never     Marital Status: Never    Housing Stability: Low Risk  (7/10/2023)    Housing Stability Vital Sign     Unable to Pay for Housing in the Last Year: No     Number of Places Lived in the Last Year: 2     Unstable Housing in the Last Year: No       Family History    Family History   Problem Relation Age of Onset    Banner's disease Father     Schizophrenia Father     Stroke Maternal Aunt     Heart disease Maternal Grandmother     Diabetes Maternal Grandmother     Arthritis Maternal Grandfather     Diabetes Maternal Grandfather     Asthma Maternal Grandfather        Allergy    Review of patient's allergies indicates:  No Known Allergies      Current Medications    Current Outpatient Medications:     albuterol (VENTOLIN HFA) 90 mcg/actuation inhaler, Inhale 2 puffs into the lungs every 6 (six) hours as needed for Wheezing or Shortness of Breath. Rescue, Disp: 18 g, Rfl: 0    cyproheptadine (PERIACTIN) 4 mg tablet, Take 1 tablet (4 mg total) by mouth 3 (three) times daily as needed (appetite)., Disp: 90 tablet, Rfl: 1    meclizine (ANTIVERT) 25 mg tablet, Take 1 tablet (25 mg total) by mouth 3 (three) times daily as needed for Dizziness., Disp: 21 tablet, Rfl: 0    multivitamin (ONE-A-DAY ESSENTIAL) per tablet, Take 1 tablet by mouth once daily., Disp: , Rfl:     ferrous sulfate 325 (65 FE) MG EC tablet, , Disp: , Rfl:     nitroGLYCERIN (NITROSTAT) 0.4 MG SL tablet, Place 1 tablet (0.4 mg total) under the tongue every 5 (five) minutes as needed for Chest pain., Disp: 30 tablet, Rfl: 3    ROS:   Please see HPI                                                                                              "                                                                               CONSTITUTIONAL:    No fever.  No night sweats.  RESPIRATORY:  No cough.  No hemoptysis.  No wheezing.  SKIN:  No poor wound healing.  No rash.  CARDIOVASCULAR:  No claudication.  No cyanosis.     HEMATOLOGY:   No adenopathy.  No easy bruising.   MUSCULOSKELETAL:  No muscle cramp.  No muscle weakness.  GASTROENTEROLOGY:  No heart burn.  No melena.    NEUROLOGIC:  No dizziness.  No generalized weakness.     Objective:     PE:  Blood pressure 113/82, pulse 70, resp. rate 16, height 5' 6.93" (1.7 m), weight 62 kg (136 lb 11 oz), last menstrual period 07/30/2023, SpO2 100 %.   BP Readings from Last 3 Encounters:   08/11/23 113/82   08/02/23 118/77   07/10/23 128/80       Wt Readings from Last 3 Encounters:   08/11/23 62 kg (136 lb 11 oz)   08/02/23 64 kg (141 lb 1.5 oz)   07/10/23 63.8 kg (140 lb 9.6 oz)     BMI 21.45 kg/m^2    GENERAL:  Ambulates.  CONSTITUTIONAL:  No acute distress.  Not ill appearing.  NECK:  No cervical adenopathy.  No carotid bruit.  CARDIOVASCULAR:  Normal rate.  Regular rhythm.  No murmur.  PULMONARY:  No respiratory distress.  No wheezing.  No crackles.  ABDOMINAL:  No distention.  No tenderness.  MUSCULOSKELETAL:  No deformity.  No edema.  SKIN:  No bruising.  No rash.  NEUROLOGICAL:  Oriented x 3.  No weakness.                                                                                                                                                                                                                                                                                                                                                                                                                                                                               CARDIAC TESTING:  EKG  No results found for this or any previous visit.  Echocardiogram  No results found for this or any previous visit.    Stress " "Test  No results found for this or any previous visit.     Coronary Angiogram  No results found for this or any previous visit.    Last BMP  BMP  Lab Results   Component Value Date     08/02/2023    K 4.0 08/02/2023    CO2 28 08/02/2023    BUN 10.3 08/02/2023    CREATININE 0.85 08/02/2023    CALCIUM 9.2 08/02/2023    EGFRNORACEVR >60 08/02/2023     Last CBC     Lab Results   Component Value Date    WBC 5.97 08/02/2023    HGB 12.0 08/02/2023    HCT 36.4 (L) 08/02/2023    MCV 95.5 (H) 08/02/2023     08/02/2023         Last lipids    Lab Results   Component Value Date    CHOL 175 07/10/2023     Lab Results   Component Value Date    HDL 55 07/10/2023     No results found for: "LDLCALC"  No results found for: "DLDL"  Lab Results   Component Value Date    TRIG 52 07/10/2023       f1 No results found for: "CHOLHDL"    Assessment:     1. Postural dizziness with presyncope  - Ambulatory referral/consult to Cardiology  - nitroGLYCERIN (NITROSTAT) 0.4 MG SL tablet; Place 1 tablet (0.4 mg total) under the tongue every 5 (five) minutes as needed for Chest pain.  Dispense: 30 tablet; Refill: 3  - Stress Echo Which stress agent will be used? Treadmill Exercise; Color Flow Doppler? Yes  - Holter monitor - 48 hour; Future    2. Asthma, unspecified asthma severity, unspecified whether complicated, unspecified whether persistent    3. Chest discomfort    Body mass index is 21.45 kg/m².  Patient is normal weight (BMI 18.5-24.9)  The patient is on no cardiac medications refills:  Patient does not need refills on cardiac medications  Tobacco:  Smoked 1 packs per day for 24 years  Alcohol:  none  Substance abuse:  tobacco  Last PCP visit:  7/10/2023      Plan:   Prescribe some lingual nitroglycerin 0.4 mg sublingual p.r.n. chest pain or discomfort with instructions to present to the emergency room if chest pain is not alleviated    Will get stress echo of heart to evaluate for cardiac origin of presyncopal symptoms    Ordered " 48 hour Holter monitor to be worn by patient with follow-up in 1 month evaluate Holter monitor    Follow up:  6 months    Tao Harrison MD      Future Appointments   Date Time Provider Department Center   8/21/2023  8:45 AM Haily Lundy FNP [facility] [place]   9/1/2023 10:00 AM Korina Ch, [facility] [place]   9/6/2023 10:00 AM Korin Blair FNP [facility] [place]   10/4/2023 10:00 AM Ty Neville MD [facility]   11/14/2023  3:30 PM Levi Rock MD [facility] [place]

## 2023-08-21 ENCOUNTER — OFFICE VISIT (OUTPATIENT)
Dept: INTERNAL MEDICINE | Facility: CLINIC | Age: 43
End: 2023-08-21
Payer: MEDICAID

## 2023-08-21 VITALS
TEMPERATURE: 98 F | BODY MASS INDEX: 22.44 KG/M2 | DIASTOLIC BLOOD PRESSURE: 76 MMHG | WEIGHT: 139.63 LBS | SYSTOLIC BLOOD PRESSURE: 125 MMHG | RESPIRATION RATE: 20 BRPM | HEART RATE: 65 BPM | HEIGHT: 66 IN

## 2023-08-21 DIAGNOSIS — G47.00 INSOMNIA, UNSPECIFIED TYPE: ICD-10-CM

## 2023-08-21 DIAGNOSIS — F32.A ANXIETY AND DEPRESSION: ICD-10-CM

## 2023-08-21 DIAGNOSIS — D53.9 MACROCYTIC ANEMIA: ICD-10-CM

## 2023-08-21 DIAGNOSIS — R42 POSTURAL DIZZINESS WITH PRESYNCOPE: ICD-10-CM

## 2023-08-21 DIAGNOSIS — F41.9 ANXIETY AND DEPRESSION: ICD-10-CM

## 2023-08-21 DIAGNOSIS — J45.909 ASTHMA, UNSPECIFIED ASTHMA SEVERITY, UNSPECIFIED WHETHER COMPLICATED, UNSPECIFIED WHETHER PERSISTENT: Primary | ICD-10-CM

## 2023-08-21 DIAGNOSIS — Z00.00 WELLNESS EXAMINATION: ICD-10-CM

## 2023-08-21 DIAGNOSIS — R55 POSTURAL DIZZINESS WITH PRESYNCOPE: ICD-10-CM

## 2023-08-21 DIAGNOSIS — E55.9 VITAMIN D DEFICIENCY: ICD-10-CM

## 2023-08-21 PROCEDURE — 1160F RVW MEDS BY RX/DR IN RCRD: CPT | Mod: CPTII,,, | Performed by: NURSE PRACTITIONER

## 2023-08-21 PROCEDURE — 3044F PR MOST RECENT HEMOGLOBIN A1C LEVEL <7.0%: ICD-10-PCS | Mod: CPTII,,, | Performed by: NURSE PRACTITIONER

## 2023-08-21 PROCEDURE — 1159F PR MEDICATION LIST DOCUMENTED IN MEDICAL RECORD: ICD-10-PCS | Mod: CPTII,,, | Performed by: NURSE PRACTITIONER

## 2023-08-21 PROCEDURE — 1160F PR REVIEW ALL MEDS BY PRESCRIBER/CLIN PHARMACIST DOCUMENTED: ICD-10-PCS | Mod: CPTII,,, | Performed by: NURSE PRACTITIONER

## 2023-08-21 PROCEDURE — 3008F PR BODY MASS INDEX (BMI) DOCUMENTED: ICD-10-PCS | Mod: CPTII,,, | Performed by: NURSE PRACTITIONER

## 2023-08-21 PROCEDURE — 1159F MED LIST DOCD IN RCRD: CPT | Mod: CPTII,,, | Performed by: NURSE PRACTITIONER

## 2023-08-21 PROCEDURE — 99215 OFFICE O/P EST HI 40 MIN: CPT | Mod: PBBFAC | Performed by: NURSE PRACTITIONER

## 2023-08-21 PROCEDURE — 3078F DIAST BP <80 MM HG: CPT | Mod: CPTII,,, | Performed by: NURSE PRACTITIONER

## 2023-08-21 PROCEDURE — 3008F BODY MASS INDEX DOCD: CPT | Mod: CPTII,,, | Performed by: NURSE PRACTITIONER

## 2023-08-21 PROCEDURE — 3078F PR MOST RECENT DIASTOLIC BLOOD PRESSURE < 80 MM HG: ICD-10-PCS | Mod: CPTII,,, | Performed by: NURSE PRACTITIONER

## 2023-08-21 PROCEDURE — 99214 OFFICE O/P EST MOD 30 MIN: CPT | Mod: S$PBB,,, | Performed by: NURSE PRACTITIONER

## 2023-08-21 PROCEDURE — 99214 PR OFFICE/OUTPT VISIT, EST, LEVL IV, 30-39 MIN: ICD-10-PCS | Mod: S$PBB,,, | Performed by: NURSE PRACTITIONER

## 2023-08-21 PROCEDURE — 3074F PR MOST RECENT SYSTOLIC BLOOD PRESSURE < 130 MM HG: ICD-10-PCS | Mod: CPTII,,, | Performed by: NURSE PRACTITIONER

## 2023-08-21 PROCEDURE — 3044F HG A1C LEVEL LT 7.0%: CPT | Mod: CPTII,,, | Performed by: NURSE PRACTITIONER

## 2023-08-21 PROCEDURE — 3074F SYST BP LT 130 MM HG: CPT | Mod: CPTII,,, | Performed by: NURSE PRACTITIONER

## 2023-08-21 RX ORDER — TRAZODONE HYDROCHLORIDE 50 MG/1
25-50 TABLET ORAL NIGHTLY PRN
Qty: 30 TABLET | Refills: 5 | Status: SHIPPED | OUTPATIENT
Start: 2023-08-21 | End: 2024-02-17
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
## 2023-08-21 NOTE — ASSESSMENT & PLAN NOTE
Trial Trazodone 25-50 mg po nightly prn  Be consistent.  Make sure your bedroom is quiet, dark, relaxing, and at a comfortable temperature.  Remove electronic devices, such as TVs, computers, and smart phones, from the bedroom.  Avoid large meals, caffeine, and alcohol before bedtime.      F/u 4 weeks for virtual visit

## 2023-08-29 ENCOUNTER — PATIENT MESSAGE (OUTPATIENT)
Dept: INTERNAL MEDICINE | Facility: CLINIC | Age: 43
End: 2023-08-29
Payer: MEDICAID

## 2023-08-29 DIAGNOSIS — R07.89 OTHER CHEST PAIN: Primary | ICD-10-CM

## 2023-08-29 NOTE — PROGRESS NOTES
[facility] Internal Medicine     Patient Name: Christ Arambula   Patient's PCP: Haily Lundy FNP    Care Update:     Update: Insurance denial   Call Date: 08/29/2023      Recommendations Provided: I was notified that insurance denied stress echo. Requested exercise stress test and echo be performed separately. Ordered TTE and exercise stress test today.    Tao Harrison MD  08/29/2023  1:19 PM    Orders Placed This Encounter    Exercise Stress - EKG    Echo

## 2023-09-01 ENCOUNTER — TELEPHONE (OUTPATIENT)
Dept: SMOKING CESSATION | Facility: CLINIC | Age: 43
End: 2023-09-01
Payer: MEDICAID

## 2023-09-01 NOTE — TELEPHONE ENCOUNTER
Attempted to contact patient regarding missed [facility] appointment.  Patient did not answer.  Counselor was unable to leave a voice message.  Voice mailbox was full.

## 2023-09-06 ENCOUNTER — OFFICE VISIT (OUTPATIENT)
Dept: OTOLARYNGOLOGY | Facility: CLINIC | Age: 43
End: 2023-09-06
Payer: MEDICAID

## 2023-09-06 VITALS
DIASTOLIC BLOOD PRESSURE: 72 MMHG | HEIGHT: 67 IN | SYSTOLIC BLOOD PRESSURE: 106 MMHG | HEART RATE: 72 BPM | TEMPERATURE: 98 F | BODY MASS INDEX: 22.19 KG/M2 | WEIGHT: 141.38 LBS

## 2023-09-06 DIAGNOSIS — Z72.0 TOBACCO USE: Primary | ICD-10-CM

## 2023-09-06 DIAGNOSIS — R55 NEAR SYNCOPE: ICD-10-CM

## 2023-09-06 PROCEDURE — 1159F MED LIST DOCD IN RCRD: CPT | Mod: CPTII,,, | Performed by: NURSE PRACTITIONER

## 2023-09-06 PROCEDURE — 3074F SYST BP LT 130 MM HG: CPT | Mod: CPTII,,, | Performed by: NURSE PRACTITIONER

## 2023-09-06 PROCEDURE — 99203 PR OFFICE/OUTPT VISIT, NEW, LEVL III, 30-44 MIN: ICD-10-PCS | Mod: S$PBB,,, | Performed by: NURSE PRACTITIONER

## 2023-09-06 PROCEDURE — 3008F PR BODY MASS INDEX (BMI) DOCUMENTED: ICD-10-PCS | Mod: CPTII,,, | Performed by: NURSE PRACTITIONER

## 2023-09-06 PROCEDURE — 3044F HG A1C LEVEL LT 7.0%: CPT | Mod: CPTII,,, | Performed by: NURSE PRACTITIONER

## 2023-09-06 PROCEDURE — 3078F DIAST BP <80 MM HG: CPT | Mod: CPTII,,, | Performed by: NURSE PRACTITIONER

## 2023-09-06 PROCEDURE — 1159F PR MEDICATION LIST DOCUMENTED IN MEDICAL RECORD: ICD-10-PCS | Mod: CPTII,,, | Performed by: NURSE PRACTITIONER

## 2023-09-06 PROCEDURE — 3008F BODY MASS INDEX DOCD: CPT | Mod: CPTII,,, | Performed by: NURSE PRACTITIONER

## 2023-09-06 PROCEDURE — 3074F PR MOST RECENT SYSTOLIC BLOOD PRESSURE < 130 MM HG: ICD-10-PCS | Mod: CPTII,,, | Performed by: NURSE PRACTITIONER

## 2023-09-06 PROCEDURE — 3044F PR MOST RECENT HEMOGLOBIN A1C LEVEL <7.0%: ICD-10-PCS | Mod: CPTII,,, | Performed by: NURSE PRACTITIONER

## 2023-09-06 PROCEDURE — 99203 OFFICE O/P NEW LOW 30 MIN: CPT | Mod: S$PBB,,, | Performed by: NURSE PRACTITIONER

## 2023-09-06 PROCEDURE — 3078F PR MOST RECENT DIASTOLIC BLOOD PRESSURE < 80 MM HG: ICD-10-PCS | Mod: CPTII,,, | Performed by: NURSE PRACTITIONER

## 2023-09-06 PROCEDURE — 99215 OFFICE O/P EST HI 40 MIN: CPT | Mod: PBBFAC | Performed by: NURSE PRACTITIONER
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
## 2023-09-06 NOTE — LETTER
September 6, 2023      [facility], Entr[address]  [address] [address]  Phone: [phone]       Patient: Christ Arambula   YOB: 1980  Date of Visit: 09/06/2023    To Whom It May Concern:    Miguelina Arambula  was at [facility] on 09/06/2023. The patient may return to work on 09/07/2023 with no restrictions. If you have any questions or concerns, or if I can be of further assistance, please do not hesitate to contact me.    Sincerely,    Josy Rosas LPN

## 2023-09-11 ENCOUNTER — HOSPITAL ENCOUNTER (OUTPATIENT)
Dept: CARDIOLOGY | Facility: HOSPITAL | Age: 43
Discharge: HOME | End: 2023-09-11
Payer: MEDICAID

## 2023-09-11 ENCOUNTER — PATIENT MESSAGE (OUTPATIENT)
Dept: INTERNAL MEDICINE | Facility: CLINIC | Age: 43
End: 2023-09-11
Payer: MEDICAID

## 2023-09-11 VITALS
WEIGHT: 141.31 LBS | HEIGHT: 67 IN | BODY MASS INDEX: 22.18 KG/M2 | SYSTOLIC BLOOD PRESSURE: 104 MMHG | DIASTOLIC BLOOD PRESSURE: 70 MMHG | RESPIRATION RATE: 20 BRPM | HEART RATE: 74 BPM

## 2023-09-11 DIAGNOSIS — R07.89 OTHER CHEST PAIN: ICD-10-CM

## 2023-09-11 LAB
CV STRESS BASE HR: 67 BPM
DIASTOLIC BLOOD PRESSURE: 70 MMHG
OHS CV CPX 85 PERCENT MAX PREDICTED HEART RATE MALE: 144
OHS CV CPX ESTIMATED METS: 13
OHS CV CPX MAX PREDICTED HEART RATE: 169
OHS CV CPX PATIENT IS FEMALE: 1
OHS CV CPX PATIENT IS MALE: 0
OHS CV CPX PEAK DIASTOLIC BLOOD PRESSURE: 83 MMHG
OHS CV CPX PEAK HEAR RATE: 160 BPM
OHS CV CPX PEAK RATE PRESSURE PRODUCT: NORMAL
OHS CV CPX PEAK SYSTOLIC BLOOD PRESSURE: 159 MMHG
OHS CV CPX PERCENT MAX PREDICTED HEART RATE ACHIEVED: 95
OHS CV CPX RATE PRESSURE PRODUCT PRESENTING: 6968
STRESS ECHO POST EXERCISE DUR MIN: 9 MINUTES
STRESS ECHO POST EXERCISE DUR SEC: 47 SECONDS
SYSTOLIC BLOOD PRESSURE: 104 MMHG

## 2023-09-11 PROCEDURE — 93017 CV STRESS TEST TRACING ONLY: CPT

## 2023-09-12 ENCOUNTER — HOSPITAL ENCOUNTER (OUTPATIENT)
Dept: CARDIOLOGY | Facility: HOSPITAL | Age: 43
Discharge: HOME | End: 2023-09-12
Payer: MEDICAID

## 2023-09-12 ENCOUNTER — PATIENT MESSAGE (OUTPATIENT)
Dept: INTERNAL MEDICINE | Facility: CLINIC | Age: 43
End: 2023-09-12
Payer: MEDICAID

## 2023-09-12 ENCOUNTER — TELEPHONE (OUTPATIENT)
Dept: INTERNAL MEDICINE | Facility: CLINIC | Age: 43
End: 2023-09-12
Payer: MEDICAID

## 2023-09-12 DIAGNOSIS — R55 POSTURAL DIZZINESS WITH PRESYNCOPE: ICD-10-CM

## 2023-09-12 DIAGNOSIS — R42 POSTURAL DIZZINESS WITH PRESYNCOPE: ICD-10-CM

## 2023-09-12 PROCEDURE — 93226 XTRNL ECG REC<48 HR SCAN A/R: CPT

## 2023-09-12 NOTE — TELEPHONE ENCOUNTER
Patient likely referring to denial for Trelegy. I see PA is pending. Can we see where we are in that process and see what alternatives they suggest?

## 2023-09-14 ENCOUNTER — OFFICE VISIT (OUTPATIENT)
Dept: BEHAVIORAL HEALTH | Facility: CLINIC | Age: 43
End: 2023-09-14
Payer: MEDICAID

## 2023-09-14 VITALS
OXYGEN SATURATION: 99 % | WEIGHT: 137.63 LBS | BODY MASS INDEX: 21.55 KG/M2 | HEART RATE: 71 BPM | TEMPERATURE: 98 F | SYSTOLIC BLOOD PRESSURE: 135 MMHG | DIASTOLIC BLOOD PRESSURE: 82 MMHG

## 2023-09-14 DIAGNOSIS — F41.1 GAD (GENERALIZED ANXIETY DISORDER): Primary | ICD-10-CM

## 2023-09-14 DIAGNOSIS — F33.1 MODERATE EPISODE OF RECURRENT MAJOR DEPRESSIVE DISORDER: ICD-10-CM

## 2023-09-14 PROCEDURE — 3044F HG A1C LEVEL LT 7.0%: CPT | Mod: CPTII,,, | Performed by: STUDENT IN AN ORGANIZED HEALTH CARE EDUCATION/TRAINING PROGRAM

## 2023-09-14 PROCEDURE — 3075F PR MOST RECENT SYSTOLIC BLOOD PRESS GE 130-139MM HG: ICD-10-PCS | Mod: CPTII,,, | Performed by: STUDENT IN AN ORGANIZED HEALTH CARE EDUCATION/TRAINING PROGRAM

## 2023-09-14 PROCEDURE — 1159F MED LIST DOCD IN RCRD: CPT | Mod: CPTII,,, | Performed by: STUDENT IN AN ORGANIZED HEALTH CARE EDUCATION/TRAINING PROGRAM

## 2023-09-14 PROCEDURE — 3075F SYST BP GE 130 - 139MM HG: CPT | Mod: CPTII,,, | Performed by: STUDENT IN AN ORGANIZED HEALTH CARE EDUCATION/TRAINING PROGRAM

## 2023-09-14 PROCEDURE — 3079F PR MOST RECENT DIASTOLIC BLOOD PRESSURE 80-89 MM HG: ICD-10-PCS | Mod: CPTII,,, | Performed by: STUDENT IN AN ORGANIZED HEALTH CARE EDUCATION/TRAINING PROGRAM

## 2023-09-14 PROCEDURE — 3079F DIAST BP 80-89 MM HG: CPT | Mod: CPTII,,, | Performed by: STUDENT IN AN ORGANIZED HEALTH CARE EDUCATION/TRAINING PROGRAM

## 2023-09-14 PROCEDURE — 99204 OFFICE O/P NEW MOD 45 MIN: CPT | Mod: S$PBB,,, | Performed by: STUDENT IN AN ORGANIZED HEALTH CARE EDUCATION/TRAINING PROGRAM

## 2023-09-14 PROCEDURE — 1159F PR MEDICATION LIST DOCUMENTED IN MEDICAL RECORD: ICD-10-PCS | Mod: CPTII,,, | Performed by: STUDENT IN AN ORGANIZED HEALTH CARE EDUCATION/TRAINING PROGRAM

## 2023-09-14 PROCEDURE — 99213 OFFICE O/P EST LOW 20 MIN: CPT | Mod: PBBFAC,PN | Performed by: STUDENT IN AN ORGANIZED HEALTH CARE EDUCATION/TRAINING PROGRAM

## 2023-09-14 PROCEDURE — 3008F PR BODY MASS INDEX (BMI) DOCUMENTED: ICD-10-PCS | Mod: CPTII,,, | Performed by: STUDENT IN AN ORGANIZED HEALTH CARE EDUCATION/TRAINING PROGRAM

## 2023-09-14 PROCEDURE — 3008F BODY MASS INDEX DOCD: CPT | Mod: CPTII,,, | Performed by: STUDENT IN AN ORGANIZED HEALTH CARE EDUCATION/TRAINING PROGRAM

## 2023-09-14 PROCEDURE — 3044F PR MOST RECENT HEMOGLOBIN A1C LEVEL <7.0%: ICD-10-PCS | Mod: CPTII,,, | Performed by: STUDENT IN AN ORGANIZED HEALTH CARE EDUCATION/TRAINING PROGRAM

## 2023-09-14 PROCEDURE — 99204 PR OFFICE/OUTPT VISIT, NEW, LEVL IV, 45-59 MIN: ICD-10-PCS | Mod: S$PBB,,, | Performed by: STUDENT IN AN ORGANIZED HEALTH CARE EDUCATION/TRAINING PROGRAM

## 2023-09-14 RX ORDER — PREDNISONE 20 MG/1
20 TABLET ORAL
COMMUNITY
Start: 2023-09-12 | End: 2023-09-18

## 2023-09-14 RX ORDER — PROMETHAZINE HYDROCHLORIDE AND DEXTROMETHORPHAN HYDROBROMIDE 6.25; 15 MG/5ML; MG/5ML
5 SYRUP ORAL EVERY 6 HOURS PRN
COMMUNITY
Start: 2023-09-12

## 2023-09-14 RX ORDER — DULOXETIN HYDROCHLORIDE 30 MG/1
30 CAPSULE, DELAYED RELEASE ORAL DAILY
Qty: 30 CAPSULE | Refills: 11 | Status: SHIPPED | OUTPATIENT
Start: 2023-09-14 | End: 2024-09-13
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
## 2023-09-18 ENCOUNTER — OFFICE VISIT (OUTPATIENT)
Dept: URGENT CARE | Facility: CLINIC | Age: 43
End: 2023-09-18
Payer: MEDICAID

## 2023-09-18 ENCOUNTER — TELEPHONE (OUTPATIENT)
Dept: INTERNAL MEDICINE | Facility: CLINIC | Age: 43
End: 2023-09-18
Payer: MEDICAID

## 2023-09-18 VITALS
OXYGEN SATURATION: 100 % | WEIGHT: 142.63 LBS | RESPIRATION RATE: 18 BRPM | HEIGHT: 67 IN | TEMPERATURE: 99 F | SYSTOLIC BLOOD PRESSURE: 128 MMHG | HEART RATE: 77 BPM | BODY MASS INDEX: 22.39 KG/M2 | DIASTOLIC BLOOD PRESSURE: 82 MMHG

## 2023-09-18 DIAGNOSIS — J06.9 UPPER RESPIRATORY TRACT INFECTION, UNSPECIFIED TYPE: Primary | ICD-10-CM

## 2023-09-18 DIAGNOSIS — J45.909 ASTHMA, UNSPECIFIED ASTHMA SEVERITY, UNSPECIFIED WHETHER COMPLICATED, UNSPECIFIED WHETHER PERSISTENT: Primary | ICD-10-CM

## 2023-09-18 LAB
CTP QC/QA: YES
MOLECULAR STREP A: NEGATIVE
OHS CV EVENT MONITOR DAY: 0
OHS CV HOLTER LENGTH DECIMAL HOURS: 48
OHS CV HOLTER LENGTH HOURS: 48
OHS CV HOLTER LENGTH MINUTES: 0
OHS CV HOLTER SINUS AVERAGE HR: 81
OHS CV HOLTER SINUS MAX HR: 113
OHS CV HOLTER SINUS MIN HR: 62
POC MOLECULAR INFLUENZA A AGN: NEGATIVE
POC MOLECULAR INFLUENZA B AGN: NEGATIVE
SARS-COV-2 RDRP RESP QL NAA+PROBE: NEGATIVE

## 2023-09-18 PROCEDURE — 99214 OFFICE O/P EST MOD 30 MIN: CPT | Mod: PBBFAC | Performed by: FAMILY MEDICINE

## 2023-09-18 PROCEDURE — 99214 PR OFFICE/OUTPT VISIT, EST, LEVL IV, 30-39 MIN: ICD-10-PCS | Mod: S$PBB,,, | Performed by: FAMILY MEDICINE

## 2023-09-18 PROCEDURE — 99214 OFFICE O/P EST MOD 30 MIN: CPT | Mod: S$PBB,,, | Performed by: FAMILY MEDICINE

## 2023-09-18 PROCEDURE — 87651 STREP A DNA AMP PROBE: CPT | Mod: PBBFAC | Performed by: FAMILY MEDICINE

## 2023-09-18 PROCEDURE — 87502 INFLUENZA DNA AMP PROBE: CPT | Mod: PBBFAC | Performed by: FAMILY MEDICINE

## 2023-09-18 PROCEDURE — 87635 SARS-COV-2 COVID-19 AMP PRB: CPT | Mod: PBBFAC | Performed by: FAMILY MEDICINE

## 2023-09-18 RX ORDER — PREDNISONE 20 MG/1
20 TABLET ORAL DAILY
Qty: 5 TABLET | Refills: 0 | Status: SHIPPED | OUTPATIENT
Start: 2023-09-18 | End: 2023-09-23

## 2023-09-19 RX ORDER — BUDESONIDE AND FORMOTEROL FUMARATE DIHYDRATE 80; 4.5 UG/1; UG/1
2 AEROSOL RESPIRATORY (INHALATION) 2 TIMES DAILY
Qty: 10.2 G | Refills: 2 | Status: SHIPPED | OUTPATIENT
Start: 2023-09-19

## 2023-09-19 NOTE — TELEPHONE ENCOUNTER
Symbicort sent. She needs to be using her Albuterol rescue inhaler as needed--it was reported as not using on 9/14/23.    As for her reported facial symptoms, she needs to go to ER for full exam.

## 2023-09-19 NOTE — PROGRESS NOTES
"Subjective:       Patient ID: Christ Arambula is a 42 y.o. female.    Chief Complaint: URI (Patient states went to Urgent Care last week (Tuesday) was swabbed for flu/strep/covid (all came back negative) and was prescribed Prednisone. Patient states she was diagnosed with acute bronchitis. Pressure behind eyes that won't go away. Sweats, body aches, chills/)      URI       42-year-old female with pressure behind her eyes, sweats, body aches, chills, and sinus congestion.  Seen in urgent care on 09/14/2023 and diagnosed with bronchitis.  No known sick contacts.  Over-the-counter medication has been ineffective.  Review of Systems   Constitutional:         As above   HENT:          As above         Objective:       Vital Signs  Temp: 98.5 °F (36.9 °C)  Temp Source: Oral  Pulse: 77  Resp: 18  SpO2: 100 %  BP: 128/82  Height and Weight  Height: 5' 7" (170.2 cm)  Weight: 64.7 kg (142 lb 9.6 oz)  BSA (Calculated - sq m): 1.75 sq meters  BMI (Calculated): 22.3  Weight in (lb) to have BMI = 25: 159.3]  Physical Exam  Constitutional:       Appearance: Normal appearance.   HENT:      Head: Normocephalic and atraumatic.      Nose: Congestion present. No rhinorrhea.      Mouth/Throat:      Pharynx: Posterior oropharyngeal erythema present. No oropharyngeal exudate.   Eyes:      Extraocular Movements: Extraocular movements intact.      Conjunctiva/sclera: Conjunctivae normal.   Cardiovascular:      Rate and Rhythm: Normal rate and regular rhythm.      Heart sounds: Normal heart sounds.   Pulmonary:      Breath sounds: Normal breath sounds.   Musculoskeletal:      Cervical back: No tenderness.   Lymphadenopathy:      Cervical: No cervical adenopathy.   Skin:     General: Skin is warm and dry.   Neurological:      General: No focal deficit present.      Mental Status: She is alert.   Psychiatric:         Mood and Affect: Mood and affect normal.         Speech: Speech normal.         Behavior: Behavior normal. Behavior is " cooperative.         Thought Content: Thought content does not include homicidal or suicidal ideation.         Assessment:       Problem List Items Addressed This Visit    None  Visit Diagnoses       Upper respiratory tract infection, unspecified type    -  Primary    Relevant Medications    predniSONE (DELTASONE) 20 MG tablet    Other Relevant Orders    POCT COVID-19 Rapid Screening (Completed)    POCT Influenza A/B MOLECULAR (Completed)    POCT Strep A, Molecular (Completed)            Plan:   Encouraged antihistamines as needed  Encouraged ibuprofen/acetaminophen as needed  ER precautions  Follow-up with PCP

## 2023-09-21 ENCOUNTER — HOSPITAL ENCOUNTER (OUTPATIENT)
Dept: PULMONOLOGY | Facility: HOSPITAL | Age: 43
Discharge: HOME | End: 2023-09-21
Attending: NURSE PRACTITIONER
Payer: MEDICAID

## 2023-09-21 ENCOUNTER — PATIENT MESSAGE (OUTPATIENT)
Dept: INTERNAL MEDICINE | Facility: CLINIC | Age: 43
End: 2023-09-21
Payer: MEDICAID

## 2023-09-21 DIAGNOSIS — J45.909 ASTHMA, UNSPECIFIED ASTHMA SEVERITY, UNSPECIFIED WHETHER COMPLICATED, UNSPECIFIED WHETHER PERSISTENT: ICD-10-CM

## 2023-09-21 LAB
DLCO SINGLE BREATH LLN: 21.51
DLCO SINGLE BREATH PRE REF: 82 %
DLCO SINGLE BREATH REF: 27.24
DLCOC SBVA LLN: 3.61
DLCOC SBVA REF: 5.01
DLCOC SINGLE BREATH LLN: 21.51
DLCOC SINGLE BREATH REF: 27.24
DLCOVA LLN: 3.61
DLCOVA PRE REF: 105.2 %
DLCOVA PRE: 5.27 ML/(MIN*MMHG*L) (ref 3.61–6.4)
DLCOVA REF: 5.01
ERV LLN: -16448.9
ERV PRE REF: 87 %
ERV REF: 1.1
FEF 25 75 CHG: 25.1 %
FEF 25 75 LLN: 1.51
FEF 25 75 POST REF: 106.6 %
FEF 25 75 PRE REF: 85.2 %
FEF 25 75 REF: 2.88
FET100 CHG: 1.8 %
FEV1 CHG: 0.8 %
FEV1 FVC CHG: 5.6 %
FEV1 FVC LLN: 71
FEV1 FVC POST REF: 105.9 %
FEV1 FVC PRE REF: 100.3 %
FEV1 FVC REF: 82
FEV1 LLN: 2.16
FEV1 POST REF: 78.3 %
FEV1 PRE REF: 77.6 %
FEV1 REF: 2.8
FRCPLETH LLN: 2.03
FRCPLETH PREREF: 103.2 %
FRCPLETH REF: 2.85
FVC CHG: -4.5 %
FVC LLN: 2.68
FVC POST REF: 73.5 %
FVC PRE REF: 77 %
FVC REF: 3.44
IVC PRE: 2.91 L (ref 2.68–4.24)
IVC SINGLE BREATH LLN: 2.68
IVC SINGLE BREATH PRE REF: 84.5 %
IVC SINGLE BREATH REF: 3.44
MVV LLN: 104
MVV PRE REF: 78.2 %
MVV REF: 123
PEF CHG: 18.3 %
PEF LLN: 4.84
PEF POST REF: 70.4 %
PEF PRE REF: 59.5 %
PEF REF: 7.04
POST FEF 25 75: 3.07 L/S (ref 1.51–4.63)
POST FET 100: 6.6 SEC
POST FEV1 FVC: 86.63 % (ref 71.16–90.61)
POST FEV1: 2.19 L (ref 2.16–3.42)
POST FVC: 2.53 L (ref 2.68–4.24)
POST PEF: 4.96 L/S (ref 4.84–9.23)
PRE DLCO: 22.32 ML/(MIN*MMHG) (ref 21.51–32.97)
PRE ERV: 0.96 L (ref -16448.9–16451.1)
PRE FEF 25 75: 2.45 L/S (ref 1.51–4.63)
PRE FET 100: 6.48 SEC
PRE FEV1 FVC: 82.04 % (ref 71.16–90.61)
PRE FEV1: 2.17 L (ref 2.16–3.42)
PRE FRC PL: 2.94 L (ref 2.03–3.68)
PRE FVC: 2.65 L (ref 2.68–4.24)
PRE MVV: 96.07 L/MIN (ref 104.45–141.32)
PRE PEF: 4.19 L/S (ref 4.84–9.23)
PRE RV: 1.99 L (ref 1.18–2.33)
PRE TLC: 5.18 L (ref 4.45–6.43)
RAW LLN: 3.06
RAW PRE REF: 102.5 %
RAW PRE: 3.14 CMH2O*S/L (ref 3.06–3.06)
RAW REF: 3.06
RV LLN: 1.18
RV PRE REF: 113.3 %
RV REF: 1.75
RVTLC LLN: 24
RVTLC PRE REF: 115.4 %
RVTLC PRE: 38.37 % (ref 23.65–42.83)
RVTLC REF: 33
TLC LLN: 4.45
TLC PRE REF: 95.1 %
TLC REF: 5.44
VA PRE: 4.24 L (ref 5.29–5.29)
VA SINGLE BREATH LLN: 5.29
VA SINGLE BREATH PRE REF: 80.1 %
VA SINGLE BREATH REF: 5.29
VC LLN: 2.68
VC PRE REF: 92.6 %
VC PRE: 3.19 L (ref 2.68–4.24)
VC REF: 3.44

## 2023-09-21 PROCEDURE — 94729 DIFFUSING CAPACITY: CPT

## 2023-09-21 PROCEDURE — 94726 PLETHYSMOGRAPHY LUNG VOLUMES: CPT

## 2023-09-21 PROCEDURE — 94729 PR C02/MEMBANE DIFFUSE CAPACITY: ICD-10-PCS | Mod: 26,,, | Performed by: INTERNAL MEDICINE

## 2023-09-21 PROCEDURE — 94060 EVALUATION OF WHEEZING: CPT | Mod: 26,,, | Performed by: INTERNAL MEDICINE

## 2023-09-21 PROCEDURE — 94640 AIRWAY INHALATION TREATMENT: CPT

## 2023-09-21 PROCEDURE — 94060 EVALUATION OF WHEEZING: CPT

## 2023-09-21 PROCEDURE — 94726 PULM FUNCT TST PLETHYSMOGRAP: ICD-10-PCS | Mod: 26,,, | Performed by: INTERNAL MEDICINE

## 2023-09-21 PROCEDURE — 94726 PLETHYSMOGRAPHY LUNG VOLUMES: CPT | Mod: 26,,, | Performed by: INTERNAL MEDICINE

## 2023-09-21 PROCEDURE — 94060 PR EVAL OF BRONCHOSPASM: ICD-10-PCS | Mod: 26,,, | Performed by: INTERNAL MEDICINE

## 2023-09-21 PROCEDURE — 94729 DIFFUSING CAPACITY: CPT | Mod: 26,,, | Performed by: INTERNAL MEDICINE

## 2023-09-29 NOTE — TELEPHONE ENCOUNTER
Completed prior authorization, approved. Contacted pharmacy to make sure they received the approval and contacted patient to notify they can now  medication.

## 2023-10-16 ENCOUNTER — PATIENT MESSAGE (OUTPATIENT)
Dept: ADMINISTRATIVE | Facility: HOSPITAL | Age: 43
End: 2023-10-16
Payer: MEDICAID

## 2023-10-24 ENCOUNTER — PATIENT OUTREACH (OUTPATIENT)
Dept: ADMINISTRATIVE | Facility: HOSPITAL | Age: 43
End: 2023-10-24
Payer: MEDICAID

## 2023-10-24 DIAGNOSIS — Z01.419 WELL WOMAN EXAM WITH ROUTINE GYNECOLOGICAL EXAM: Primary | ICD-10-CM

## 2023-10-24 NOTE — PROGRESS NOTES
Message sent to patient via [facility] patient portal for Population [facility]k Outreach for cervical cancer screening. Response received from pt requesting to schedule appt.   Chart reviewed. Pt has an appt scheduled in [facility] clinic on 2/20/2025. Option given to pt of referral to outside gyn provider who accepts her insurance. Awaiting reply.

## 2023-10-24 NOTE — PROGRESS NOTES
Pt reply received, stating yes to option for outside gyn for sooner appt. Referral entered per [facility]. Pending provider review/ approval. Pt aware.

## 2023-11-20 PROBLEM — Z00.00 WELLNESS EXAMINATION: Status: RESOLVED | Noted: 2023-07-10 | Resolved: 2023-11-20

## 2024-01-15 ENCOUNTER — HOSPITAL ENCOUNTER (EMERGENCY)
Facility: HOSPITAL | Age: 44
Discharge: LEFT BEFORE DISCHARGE | End: 2024-01-15
Attending: EMERGENCY MEDICINE
Payer: MEDICAID

## 2024-01-15 VITALS
SYSTOLIC BLOOD PRESSURE: 133 MMHG | HEART RATE: 77 BPM | DIASTOLIC BLOOD PRESSURE: 76 MMHG | RESPIRATION RATE: 18 BRPM | TEMPERATURE: 99 F | BODY MASS INDEX: 21.82 KG/M2 | OXYGEN SATURATION: 100 % | WEIGHT: 139.31 LBS

## 2024-01-15 DIAGNOSIS — Z53.21 ELOPED FROM EMERGENCY DEPARTMENT: ICD-10-CM

## 2024-01-15 DIAGNOSIS — R10.9 ABDOMINAL CRAMPING: Primary | ICD-10-CM

## 2024-01-15 DIAGNOSIS — N92.6 MENSTRUAL PERIOD LATE: ICD-10-CM

## 2024-01-15 LAB
APPEARANCE UR: CLEAR
B-HCG UR QL: NEGATIVE
BACTERIA #/AREA URNS AUTO: ABNORMAL /HPF
BILIRUB UR QL STRIP.AUTO: NEGATIVE
COLOR UR AUTO: YELLOW
CTP QC/QA: YES
GLUCOSE UR QL STRIP.AUTO: NORMAL
HYALINE CASTS #/AREA URNS LPF: ABNORMAL /LPF
KETONES UR QL STRIP.AUTO: NEGATIVE
LEUKOCYTE ESTERASE UR QL STRIP.AUTO: NEGATIVE
MUCOUS THREADS URNS QL MICRO: ABNORMAL /LPF
NITRITE UR QL STRIP.AUTO: NEGATIVE
PH UR STRIP.AUTO: 5.5 [PH]
PROT UR QL STRIP.AUTO: ABNORMAL
RBC #/AREA URNS AUTO: ABNORMAL /HPF
RBC UR QL AUTO: NEGATIVE
SP GR UR STRIP.AUTO: 1.03 (ref 1–1.03)
SQUAMOUS #/AREA URNS LPF: ABNORMAL /HPF
UROBILINOGEN UR STRIP-ACNC: NORMAL
WBC #/AREA URNS AUTO: ABNORMAL /HPF

## 2024-01-15 PROCEDURE — 81025 URINE PREGNANCY TEST: CPT | Performed by: PHYSICIAN ASSISTANT

## 2024-01-15 PROCEDURE — 99283 EMERGENCY DEPT VISIT LOW MDM: CPT

## 2024-01-15 PROCEDURE — 81001 URINALYSIS AUTO W/SCOPE: CPT | Performed by: PHYSICIAN ASSISTANT

## 2024-01-15 RX ORDER — KETOROLAC TROMETHAMINE 30 MG/ML
15 INJECTION, SOLUTION INTRAMUSCULAR; INTRAVENOUS
Status: DISCONTINUED | OUTPATIENT
Start: 2024-01-15 | End: 2024-01-15 | Stop reason: HOSPADM
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
## 2024-01-15 NOTE — FIRST PROVIDER EVALUATION
Medical screening examination initiated.  I have conducted a focused provider triage encounter, findings are as follows:    Brief history of present illness:  lower abdominal pain x 2 weeks. LMP 12/12. Concerned she is pregnant    There were no vitals filed for this visit.    Pertinent physical exam:  nad    Brief workup plan:  upt and labs    Preliminary workup initiated; this workup will be continued and followed by the physician or advanced practice provider that is assigned to the patient when roomed.

## 2024-01-18 ENCOUNTER — OFFICE VISIT (OUTPATIENT)
Dept: URGENT CARE | Facility: CLINIC | Age: 44
End: 2024-01-18
Payer: MEDICAID

## 2024-01-18 VITALS
OXYGEN SATURATION: 99 % | TEMPERATURE: 98 F | WEIGHT: 139.63 LBS | DIASTOLIC BLOOD PRESSURE: 93 MMHG | HEART RATE: 89 BPM | BODY MASS INDEX: 21.92 KG/M2 | SYSTOLIC BLOOD PRESSURE: 145 MMHG | RESPIRATION RATE: 16 BRPM | HEIGHT: 67 IN

## 2024-01-18 DIAGNOSIS — R51.9 NONINTRACTABLE HEADACHE, UNSPECIFIED CHRONICITY PATTERN, UNSPECIFIED HEADACHE TYPE: Primary | ICD-10-CM

## 2024-01-18 PROCEDURE — 99214 OFFICE O/P EST MOD 30 MIN: CPT | Mod: PBBFAC | Performed by: FAMILY MEDICINE

## 2024-01-18 PROCEDURE — 99214 OFFICE O/P EST MOD 30 MIN: CPT | Mod: S$PBB,,, | Performed by: FAMILY MEDICINE

## 2024-01-18 PROCEDURE — 63600175 PHARM REV CODE 636 W HCPCS: Performed by: FAMILY MEDICINE

## 2024-01-18 RX ORDER — SUMATRIPTAN 50 MG/1
50 TABLET, FILM COATED ORAL 3 TIMES DAILY PRN
Qty: 12 TABLET | Refills: 1 | Status: SHIPPED | OUTPATIENT
Start: 2024-01-18 | End: 2024-02-17

## 2024-01-18 RX ORDER — KETOROLAC TROMETHAMINE 30 MG/ML
60 INJECTION, SOLUTION INTRAMUSCULAR; INTRAVENOUS
Status: COMPLETED | OUTPATIENT
Start: 2024-01-18 | End: 2024-01-18

## 2024-01-18 RX ADMIN — KETOROLAC TROMETHAMINE 60 MG: 30 INJECTION, SOLUTION INTRAMUSCULAR at 07:01

## 2024-01-19 NOTE — PROGRESS NOTES
"Subjective:       Patient ID: Christ Arambula is a 43 y.o. female.    Chief Complaint: Headache (States HA on/off x 3 days. Tramadol @ 1630. States is under more stress/anxious.)      Headache       43-year-old female with headache intermittently for 3 days.  She has a past medical history of headaches.  More stressed and anxious lately.  Injuries, falls, changes in vision, or headache different from previous headaches.  She took a tramadol at 4:30 p.m., no improvement.  Review of Systems   Neurological:  Positive for headaches.         Objective:       Vital Signs  Temp: 98.1 °F (36.7 °C)  Pulse: 89  Resp: 16  SpO2: 99 %  BP: (!) 145/93  Height and Weight  Height: 5' 7" (170.2 cm)  Weight: 63.3 kg (139 lb 9.6 oz)  BSA (Calculated - sq m): 1.73 sq meters  BMI (Calculated): 21.9  Weight in (lb) to have BMI = 25: 159.3]  Physical Exam  Constitutional:       Appearance: Normal appearance.      Comments: Tearful   HENT:      Head: Normocephalic and atraumatic.   Eyes:      Extraocular Movements: Extraocular movements intact.      Conjunctiva/sclera: Conjunctivae normal.   Cardiovascular:      Rate and Rhythm: Normal rate and regular rhythm.      Heart sounds: Murmur (2/6 systolic) heard.   Pulmonary:      Breath sounds: Normal breath sounds.   Skin:     General: Skin is warm and dry.   Neurological:      General: No focal deficit present.      Mental Status: She is alert.   Psychiatric:         Mood and Affect: Mood and affect normal.         Speech: Speech normal.         Behavior: Behavior normal. Behavior is cooperative.         Thought Content: Thought content does not include homicidal or suicidal ideation.         Assessment:       Problem List Items Addressed This Visit          Neuro    Headache - Primary    Relevant Medications    ketorolac injection 60 mg (Start on 1/18/2024  7:45 PM)    sumatriptan (IMITREX) 50 MG tablet       Plan:   Encouraged to seek cool, dark, quiet room to rest  ER " precautions  Follow-up with PCP

## 2024-02-15 ENCOUNTER — HOSPITAL ENCOUNTER (EMERGENCY)
Facility: HOSPITAL | Age: 44
Discharge: HOME | End: 2024-02-15
Attending: EMERGENCY MEDICINE
Payer: MEDICAID

## 2024-02-15 VITALS
OXYGEN SATURATION: 98 % | WEIGHT: 132.69 LBS | BODY MASS INDEX: 20.79 KG/M2 | TEMPERATURE: 98 F | DIASTOLIC BLOOD PRESSURE: 89 MMHG | HEART RATE: 70 BPM | RESPIRATION RATE: 16 BRPM | SYSTOLIC BLOOD PRESSURE: 142 MMHG

## 2024-02-15 DIAGNOSIS — R07.89 ATYPICAL CHEST PAIN: Primary | ICD-10-CM

## 2024-02-15 DIAGNOSIS — R09.1 PLEURISY: ICD-10-CM

## 2024-02-15 LAB
ALBUMIN SERPL-MCNC: 4.1 G/DL (ref 3.5–5)
ALBUMIN/GLOB SERPL: 1.1 RATIO (ref 1.1–2)
ALP SERPL-CCNC: 42 UNIT/L (ref 40–150)
ALT SERPL-CCNC: 8 UNIT/L (ref 0–55)
AST SERPL-CCNC: 12 UNIT/L (ref 5–34)
B-HCG UR QL: NEGATIVE
BASOPHILS # BLD AUTO: 0.02 X10(3)/MCL
BASOPHILS NFR BLD AUTO: 0.3 %
BILIRUB SERPL-MCNC: 0.6 MG/DL
BUN SERPL-MCNC: 8.4 MG/DL (ref 7–18.7)
CALCIUM SERPL-MCNC: 9.1 MG/DL (ref 8.4–10.2)
CHLORIDE SERPL-SCNC: 106 MMOL/L (ref 98–107)
CK MB SERPL-MCNC: <1 NG/ML
CK SERPL-CCNC: <70 U/L (ref 29–168)
CO2 SERPL-SCNC: 28 MMOL/L (ref 22–29)
CREAT SERPL-MCNC: 0.82 MG/DL (ref 0.55–1.02)
CTP QC/QA: YES
EOSINOPHIL # BLD AUTO: 0.06 X10(3)/MCL (ref 0–0.9)
EOSINOPHIL NFR BLD AUTO: 1 %
ERYTHROCYTE [DISTWIDTH] IN BLOOD BY AUTOMATED COUNT: 13.2 % (ref 11.5–17)
GFR SERPLBLD CREATININE-BSD FMLA CKD-EPI: >60 MLS/MIN/1.73/M2
GLOBULIN SER-MCNC: 3.6 GM/DL (ref 2.4–3.5)
GLUCOSE SERPL-MCNC: 87 MG/DL (ref 74–100)
HCT VFR BLD AUTO: 36.1 % (ref 37–47)
HGB BLD-MCNC: 12.2 G/DL (ref 12–16)
HOLD SPECIMEN: NORMAL
IMM GRANULOCYTES # BLD AUTO: 0.01 X10(3)/MCL (ref 0–0.04)
IMM GRANULOCYTES NFR BLD AUTO: 0.2 %
LYMPHOCYTES # BLD AUTO: 1.92 X10(3)/MCL (ref 0.6–4.6)
LYMPHOCYTES NFR BLD AUTO: 33.2 %
MCH RBC QN AUTO: 31.8 PG (ref 27–31)
MCHC RBC AUTO-ENTMCNC: 33.8 G/DL (ref 33–36)
MCV RBC AUTO: 94 FL (ref 80–94)
MONOCYTES # BLD AUTO: 0.34 X10(3)/MCL (ref 0.1–1.3)
MONOCYTES NFR BLD AUTO: 5.9 %
NEUTROPHILS # BLD AUTO: 3.43 X10(3)/MCL (ref 2.1–9.2)
NEUTROPHILS NFR BLD AUTO: 59.4 %
NRBC BLD AUTO-RTO: 0 %
OHS QRS DURATION: 102 MS
OHS QTC CALCULATION: 429 MS
PLATELET # BLD AUTO: 316 X10(3)/MCL (ref 130–400)
PMV BLD AUTO: 9.8 FL (ref 7.4–10.4)
POTASSIUM SERPL-SCNC: 4.1 MMOL/L (ref 3.5–5.1)
PROT SERPL-MCNC: 7.7 GM/DL (ref 6.4–8.3)
RBC # BLD AUTO: 3.84 X10(6)/MCL (ref 4.2–5.4)
SODIUM SERPL-SCNC: 140 MMOL/L (ref 136–145)
TROPONIN I SERPL-MCNC: <0.01 NG/ML (ref 0–0.04)
TROPONIN I SERPL-MCNC: <0.01 NG/ML (ref 0–0.04)
WBC # SPEC AUTO: 5.78 X10(3)/MCL (ref 4.5–11.5)

## 2024-02-15 PROCEDURE — 81025 URINE PREGNANCY TEST: CPT | Performed by: NURSE PRACTITIONER

## 2024-02-15 PROCEDURE — 93005 ELECTROCARDIOGRAM TRACING: CPT

## 2024-02-15 PROCEDURE — 84484 ASSAY OF TROPONIN QUANT: CPT | Performed by: NURSE PRACTITIONER

## 2024-02-15 PROCEDURE — 82550 ASSAY OF CK (CPK): CPT | Performed by: PHYSICIAN ASSISTANT

## 2024-02-15 PROCEDURE — 82553 CREATINE MB FRACTION: CPT | Performed by: PHYSICIAN ASSISTANT

## 2024-02-15 PROCEDURE — 96372 THER/PROPH/DIAG INJ SC/IM: CPT | Performed by: PHYSICIAN ASSISTANT

## 2024-02-15 PROCEDURE — 85025 COMPLETE CBC W/AUTO DIFF WBC: CPT | Performed by: NURSE PRACTITIONER

## 2024-02-15 PROCEDURE — 84484 ASSAY OF TROPONIN QUANT: CPT | Performed by: PHYSICIAN ASSISTANT

## 2024-02-15 PROCEDURE — 63600175 PHARM REV CODE 636 W HCPCS: Performed by: PHYSICIAN ASSISTANT

## 2024-02-15 PROCEDURE — 99284 EMERGENCY DEPT VISIT MOD MDM: CPT | Mod: 25

## 2024-02-15 PROCEDURE — 80053 COMPREHEN METABOLIC PANEL: CPT | Performed by: NURSE PRACTITIONER

## 2024-02-15 RX ORDER — KETOROLAC TROMETHAMINE 30 MG/ML
30 INJECTION, SOLUTION INTRAMUSCULAR; INTRAVENOUS
Status: COMPLETED | OUTPATIENT
Start: 2024-02-15 | End: 2024-02-15

## 2024-02-15 RX ORDER — METHOCARBAMOL 500 MG/1
500 TABLET, FILM COATED ORAL 3 TIMES DAILY
Qty: 15 TABLET | Refills: 0 | Status: SHIPPED | OUTPATIENT
Start: 2024-02-15 | End: 2024-02-20

## 2024-02-15 RX ORDER — METHOCARBAMOL 100 MG/ML
500 INJECTION, SOLUTION INTRAMUSCULAR; INTRAVENOUS
Status: COMPLETED | OUTPATIENT
Start: 2024-02-15 | End: 2024-02-15

## 2024-02-15 RX ORDER — KETOROLAC TROMETHAMINE 10 MG/1
10 TABLET, FILM COATED ORAL EVERY 6 HOURS
Qty: 20 TABLET | Refills: 0 | Status: SHIPPED | OUTPATIENT
Start: 2024-02-15 | End: 2024-02-20

## 2024-02-15 RX ADMIN — KETOROLAC TROMETHAMINE 30 MG: 30 INJECTION, SOLUTION INTRAMUSCULAR; INTRAVENOUS at 11:02

## 2024-02-15 RX ADMIN — METHOCARBAMOL 500 MG: 100 INJECTION, SOLUTION INTRAMUSCULAR; INTRAVENOUS at 11:02

## 2024-02-15 NOTE — ED PROVIDER NOTES
"Encounter Date: 2/15/2024       History     Chief Complaint   Patient presents with    Chest Pain     PT W CO PLEURITIC TYPE CP SINCE THIS AM.  REPORTS "PULLS IN CHEST" WHEN SHE TURNS HER NECK.  EKG OBTAINED.     Patient reports to the emergency room with complaints of anterior chest pain starting at 6:00 a.m.; patient reports pain is worse with deep breaths as well as with certain movements of her torso and neck; patient denies any cough, fever, shortness of breath; patient reports similar symptoms several years ago when she was diagnosed with pleurisy    The history is provided by the patient.   Chest Pain  The current episode started several hours ago. The chest pain is unchanged. At its most intense, the chest pain is at 8/10. The chest pain is currently at 3/10. The quality of the pain is described as pleuritic and similar to previous episodes. Chest pain is worsened by certain positions and deep breathing. Pertinent negatives for primary symptoms include no fever, no fatigue, no shortness of breath, no cough, no palpitations, no abdominal pain, no nausea and no vomiting.   Pertinent negatives for associated symptoms include no claudication, no near-syncope, no numbness and no weakness.   Pertinent negatives for past medical history include no CAD, no CHF, no diabetes, no DVT, no MI, no PE, no PVD and no strokes.     Review of patient's allergies indicates:  No Known Allergies  Past Medical History:   Diagnosis Date    Asthma     Pancreatitis 2017     Past Surgical History:   Procedure Laterality Date    NO PAST SURGERIES       Family History   Problem Relation Age of Onset    [place]'s disease Father     Schizophrenia Father     Stroke Maternal Aunt     Heart disease Maternal Grandmother     Diabetes Maternal Grandmother     Arthritis Maternal Grandfather     Diabetes Maternal Grandfather     Asthma Maternal Grandfather      Social History     Tobacco Use    Smoking status: Every Day     Current packs/day: " 0.00     Average packs/day: 1 pack/day for 25.8 years (25.8 ttl pk-yrs)     Types: Cigarettes     Start date: 1997     Last attempt to quit: 2023     Years since quittin.6     Passive exposure: Never    Tobacco comments:     I smoke  to much wanna stop but its hard.   Substance Use Topics    Alcohol use: Not Currently     Comment: Occasionally    Drug use: Never     Types: Marijuana     Comment: last used early 2020     Review of Systems   Constitutional:  Negative for fatigue and fever.   HENT:  Negative for sore throat.    Eyes: Negative.    Respiratory:  Negative for cough and shortness of breath.    Cardiovascular:  Positive for chest pain. Negative for palpitations, claudication and near-syncope.   Gastrointestinal:  Negative for abdominal pain, nausea and vomiting.   Genitourinary:  Negative for dysuria.   Musculoskeletal:  Negative for back pain.   Skin:  Negative for rash.   Neurological:  Negative for weakness and numbness.   Hematological:  Does not bruise/bleed easily.   Psychiatric/Behavioral:  Negative for behavioral problems, confusion, decreased concentration, dysphoric mood, hallucinations, self-injury and suicidal ideas. The patient is not hyperactive.        Physical Exam     Initial Vitals [02/15/24 1025]   BP Pulse Resp Temp SpO2   (!) 151/80 69 16 97.9 °F (36.6 °C) 100 %      MAP       --         Physical Exam    Vitals reviewed.  Constitutional: She appears well-developed and well-nourished.   HENT:   Head: Normocephalic and atraumatic.   Eyes: Conjunctivae and EOM are normal. Pupils are equal, round, and reactive to light.   Neck: Neck supple.   Normal range of motion.  Cardiovascular:  Normal rate, regular rhythm, normal heart sounds and intact distal pulses.           Pulmonary/Chest: Breath sounds normal. No respiratory distress. She has no wheezes. She exhibits tenderness.     Pain reproducible with palpation as well as during auscultation when patient was taking deep  breaths as well as when patient rotates her torso   Abdominal: Abdomen is soft. Bowel sounds are normal. There is no abdominal tenderness.   Musculoskeletal:         General: Normal range of motion.      Cervical back: Normal range of motion and neck supple.     Neurological: She is alert and oriented to person, place, and time. She displays normal reflexes. No cranial nerve deficit or sensory deficit.   Skin: Skin is warm and dry.   Psychiatric: She has a normal mood and affect. Her behavior is normal. Judgment and thought content normal.         ED Course   Procedures  Labs Reviewed   COMPREHENSIVE METABOLIC PANEL - Abnormal; Notable for the following components:       Result Value    Globulin 3.6 (*)     All other components within normal limits   CBC WITH DIFFERENTIAL - Abnormal; Notable for the following components:    RBC 3.84 (*)     Hct 36.1 (*)     MCH 31.8 (*)     All other components within normal limits   TROPONIN I - Normal   CK - Normal   CK-MB - Normal   TROPONIN I - Normal   CBC W/ AUTO DIFFERENTIAL    Narrative:     The following orders were created for panel order CBC auto differential.  Procedure                               Abnormality         Status                     ---------                               -----------         ------                     CBC with Differential[[id number]]       Abnormal            Final result                 Please view results for these tests on the individual orders.   EXTRA TUBES    Narrative:     The following orders were created for panel order EXTRA TUBES.  Procedure                               Abnormality         Status                     ---------                               -----------         ------                     Light Blue Top Hold[[id number]]                             Final result                 Please view results for these tests on the individual orders.   LIGHT BLUE TOP HOLD   POCT URINE PREGNANCY     EKG Readings: (Independently  Interpreted)   Initial Reading: No STEMI. Rhythm: Normal Sinus Rhythm. Heart Rate: 66. Ectopy: No Ectopy. Conduction: Normal. ST Segments: Normal ST Segments. T Waves: Normal. Axis: Left Axis Deviation. Clinical Impression: Normal Sinus Rhythm with PVCs     ECG Results              EKG 12-lead (Chest Pain) Age >30 (Final result)        Collection Time Result Time QRS Duration OHS QTC Calculation    02/15/24 10:32:09 02/15/24 14:41:42 102 429                     Final result by Interface, Lab In [facility] (02/15/24 14:41:51)                   Narrative:    Test Reason : R07.9,    Vent. Rate : 066 BPM     Atrial Rate : 066 BPM     P-R Int : 204 ms          QRS Dur : 102 ms      QT Int : 410 ms       P-R-T Axes : 059 -39 039 degrees     QTc Int : 429 ms    Sinus rhythm with occasional Premature ventricular complexes  Left axis deviation  Incomplete right bundle branch block  Abnormal ECG  When compared with ECG of 11-SEP-2023 13:16,  Premature ventricular complexes are now Present  The axis Shifted left  Minimal criteria for Inferior infarct are no longer Present  Confirmed by Jenna Sanchez MD ([id number]) on 2/15/2024 2:41:39 PM    Referred By:             Confirmed By:Jenna Sanchez MD                                  Imaging Results              X-Ray Chest PA And Lateral (Final result)  Result time 02/15/24 11:28:50      Final result by Emir Mahajan MD (02/15/24 11:28:50)                   Impression:      No acute cardiopulmonary abnormality.      Electronically signed by: Emir Mahajan  Date:    02/15/2024  Time:    11:28               Narrative:    EXAMINATION:  XR CHEST PA AND LATERAL    CLINICAL HISTORY:  Chest pain, unspecified    COMPARISON:  2 August 2023    FINDINGS:  PA and lateral views of the chest were obtained. Heart and mediastinum within normal limits. The lungs are clear. No pneumothorax or significant effusion.                                       Medications   ketorolac injection 30 mg (30 mg  Intramuscular Given 2/15/24 1157)   methocarbamoL injection 500 mg (500 mg Intramuscular Given 2/15/24 1157)     Medical Decision Making  Discussed with patient following up with Cardiology again due to reports of chest pain starting today; pain is reproducible with movement as well as with palpation therefore suspect pleurisy or musculoskeletal pain; patient reports complete resolution of symptoms at this time and is comfortable with plan for discharge; patient given strict ER precautions for return if her symptoms return or worsen    Amount and/or Complexity of Data Reviewed  Discussion of management or test interpretation with external provider(s): Discussed with Dr. Uribe in the emergency room who agrees with plan after reviewing HPI, labs, diagnostics including EKG    Risk  Prescription drug management.  Risk Details: Given strict ED return precautions. I have spoken with the patient and/or caregivers. I have explained the patient's condition, diagnoses and treatment plan based on the information available to me at this time. I have answered the patient's and/or caregiver's questions and addressed any concerns. The patient and/or caregivers have as good an understanding of the patient's diagnosis, condition and treatment plan as can be expected at this point. The vital signs have been stable. The patient's condition is stable and appropriate for discharge from the emergency department.      The patient will pursue further outpatient evaluation with the primary care physician or other designated or consulting physician as outlined in the discharge instructions. The patient and/or caregivers are agreeable to this plan of care and follow-up instructions have been explained in detail. The patient and/or caregivers have received these instructions in written format and have expressed an understanding of the discharge instructions. The patient and/or caregivers are aware that any significant change in condition or  worsening of symptoms should prompt an immediate return to this or the closest emergency department or a call to 911.               ED Course as of 02/15/24 1509   u Feb 15, 2024   1506 Patient reports significant improvement of symptoms at this time [AL]      ED Course User Index  [AL] Christian Gee PA                           Clinical Impression:  Final diagnoses:  [R07.89] Atypical chest pain (Primary)  [R09.1] Pleurisy          ED Disposition Condition    Discharge Stable          ED Prescriptions       Medication Sig Dispense Start Date End Date Auth. Provider    ketorolac (TORADOL) 10 mg tablet Take 1 tablet (10 mg total) by mouth every 6 (six) hours. for 5 days 20 tablet 2/15/2024 2/20/2024 Christian Gee PA    methocarbamoL (ROBAXIN) 500 MG Tab Take 1 tablet (500 mg total) by mouth 3 (three) times daily. for 5 days 15 tablet 2/15/2024 2/20/2024 Christian Gee PA          Follow-up Information       Follow up With Specialties Details Why Contact Info    Haily Lundy FNP Family Medicine   [facility] [address]  [phone]      discharge followup    If your symptoms become WORSE or you DO NOT IMPROVE and you are unable to reach your health care provider, you should RETURN to the emergency department    discharge info    Discussed all pertinent ED information, results, diagnosis and treatment plan; All questions and concerns were addressed at this time. Patient voices understanding of information and instructions. Patient is comfortable with plan and discharge             Christian Gee PA  02/15/24 1509

## 2024-02-15 NOTE — FIRST PROVIDER EVALUATION
Medical screening examination initiated.  I have conducted a focused provider triage encounter, findings are as follows:    Brief history of present illness:  chest pain    Vitals:    02/15/24 1025   BP: (!) 151/80   BP Location: Left arm   Patient Position: Sitting   Pulse: 69   Resp: 16   Temp: 97.9 °F (36.6 °C)   TempSrc: Tympanic   SpO2: 100%   Weight: 60.2 kg (132 lb 11.5 oz)       Pertinent physical exam:  deferred    Brief workup plan:  cardiac workup    Preliminary workup initiated; this workup will be continued and followed by the physician or advanced practice provider that is assigned to the patient when roomed.

## 2024-03-05 ENCOUNTER — HOSPITAL ENCOUNTER (EMERGENCY)
Facility: HOSPITAL | Age: 44
Discharge: HOME | End: 2024-03-05
Attending: STUDENT IN AN ORGANIZED HEALTH CARE EDUCATION/TRAINING PROGRAM
Payer: MEDICAID

## 2024-03-05 ENCOUNTER — OFFICE VISIT (OUTPATIENT)
Dept: URGENT CARE | Facility: CLINIC | Age: 44
End: 2024-03-05
Payer: MEDICAID

## 2024-03-05 VITALS
SYSTOLIC BLOOD PRESSURE: 150 MMHG | HEIGHT: 65 IN | TEMPERATURE: 98 F | DIASTOLIC BLOOD PRESSURE: 70 MMHG | RESPIRATION RATE: 18 BRPM | HEART RATE: 64 BPM | OXYGEN SATURATION: 100 % | BODY MASS INDEX: 22.77 KG/M2 | WEIGHT: 136.69 LBS

## 2024-03-05 VITALS
WEIGHT: 130.94 LBS | DIASTOLIC BLOOD PRESSURE: 77 MMHG | OXYGEN SATURATION: 100 % | BODY MASS INDEX: 20.55 KG/M2 | RESPIRATION RATE: 16 BRPM | TEMPERATURE: 99 F | HEIGHT: 67 IN | HEART RATE: 77 BPM | SYSTOLIC BLOOD PRESSURE: 127 MMHG

## 2024-03-05 DIAGNOSIS — N89.8 VAGINAL DISCHARGE: Primary | ICD-10-CM

## 2024-03-05 DIAGNOSIS — R63.0 DECREASED APPETITE: ICD-10-CM

## 2024-03-05 DIAGNOSIS — Z13.9 ENCOUNTER FOR MEDICAL SCREENING EXAMINATION: Primary | ICD-10-CM

## 2024-03-05 LAB
C TRACH DNA SPEC QL NAA+PROBE: NOT DETECTED
CLUE CELLS VAG QL WET PREP: NORMAL
N GONORRHOEA DNA SPEC QL NAA+PROBE: NOT DETECTED
SOURCE (OHS): NORMAL
T VAGINALIS VAG QL WET PREP: NORMAL
WBC #/AREA VAG WET PREP: NORMAL
YEAST SPEC QL WET PREP: NORMAL

## 2024-03-05 PROCEDURE — 99281 EMR DPT VST MAYX REQ PHY/QHP: CPT | Mod: 27

## 2024-03-05 PROCEDURE — 87491 CHLMYD TRACH DNA AMP PROBE: CPT | Performed by: FAMILY MEDICINE

## 2024-03-05 PROCEDURE — 87661 TRICHOMONAS VAGINALIS AMPLIF: CPT | Performed by: FAMILY MEDICINE

## 2024-03-05 PROCEDURE — 99214 OFFICE O/P EST MOD 30 MIN: CPT | Mod: S$PBB,,, | Performed by: FAMILY MEDICINE

## 2024-03-05 PROCEDURE — 99215 OFFICE O/P EST HI 40 MIN: CPT | Mod: PBBFAC | Performed by: FAMILY MEDICINE

## 2024-03-05 PROCEDURE — 87210 SMEAR WET MOUNT SALINE/INK: CPT | Mod: 59 | Performed by: FAMILY MEDICINE

## 2024-03-05 RX ORDER — CYPROHEPTADINE HYDROCHLORIDE 4 MG/1
4 TABLET ORAL 3 TIMES DAILY PRN
Qty: 60 TABLET | Refills: 0 | Status: SHIPPED | OUTPATIENT
Start: 2024-03-05 | End: 2024-05-31 | Stop reason: SDUPTHER

## 2024-03-05 NOTE — ED PROVIDER NOTES
Encounter Date: 3/5/2024       History     Chief Complaint   Patient presents with    Medication Refill     Pt here for medication refill. No other problems noted at this time.      Requests medication refill, no complaitns at this time      Review of patient's allergies indicates:  No Known Allergies  Past Medical History:   Diagnosis Date    Asthma     Pancreatitis 2017     Past Surgical History:   Procedure Laterality Date    NO PAST SURGERIES       Family History   Problem Relation Age of Onset    [place]'s disease Father     Schizophrenia Father     Stroke Maternal Aunt     Heart disease Maternal Grandmother     Diabetes Maternal Grandmother     Arthritis Maternal Grandfather     Diabetes Maternal Grandfather     Asthma Maternal Grandfather      Social History     Tobacco Use    Smoking status: Every Day     Current packs/day: 0.00     Average packs/day: 1 pack/day for 25.8 years (25.8 ttl pk-yrs)     Types: Cigarettes     Start date: 1997     Last attempt to quit: 2023     Years since quittin.6     Passive exposure: Never    Tobacco comments:     I smoke  to much wanna stop but its hard.   Substance Use Topics    Alcohol use: Not Currently     Comment: Occasionally    Drug use: Never     Types: Marijuana     Comment: last used early 2020     Review of Systems   Constitutional:  Negative for fever.   HENT:  Negative for sore throat.    Respiratory:  Negative for shortness of breath.    Cardiovascular:  Negative for chest pain.   Gastrointestinal:  Negative for nausea.   Genitourinary:  Negative for dysuria.   Musculoskeletal:  Negative for back pain.   Skin:  Negative for rash.   Neurological:  Negative for weakness.   Hematological:  Does not bruise/bleed easily.   All other systems reviewed and are negative.      Physical Exam     Initial Vitals [24 0825]   BP Pulse Resp Temp SpO2   (!) 150/70 64 18 98.2 °F (36.8 °C) 100 %      MAP       --         Physical Exam    Nursing note and  vitals reviewed.  Constitutional: She appears well-developed and well-nourished.   HENT:   Head: Normocephalic and atraumatic.   Neck: Neck supple.   Normal range of motion.  Cardiovascular:  Normal rate, regular rhythm and normal heart sounds.           Pulmonary/Chest: Effort normal and breath sounds normal.   Abdominal: Abdomen is flat. There is no abdominal tenderness.   Musculoskeletal:         General: Normal range of motion.      Cervical back: Normal range of motion and neck supple.     Neurological: She is alert. She has normal strength.   Skin: Skin is warm and dry.   Psychiatric: She has a normal mood and affect.         ED Course   Procedures  Labs Reviewed - No data to display       Imaging Results    None          Medications - No data to display  Medical Decision Making  An emergency medical screening examination have been completed for this patient.  After completed vital signs and initial emergency assessment, no acute, unstable, medical emergency condition have been identified.  Patient has been informed about alternatives options for care including urgent care facilities, primary care provider office and virtual urgent care services (Telemedicine).  Pt was also provided with the option to wait for non-emergency care in the Emergency Department. Pt understood that no acute emergency is identified and understood the signs and symptoms that could require return to the emergency department for new evaluation.  Pt is discharged from the emergency department in stable condition.                                      Clinical Impression:  Final diagnoses:  [Z13.9] Encounter for medical screening examination (Primary)          ED Disposition Condition    Discharge Stable          ED Prescriptions    None       Follow-up Information       Follow up With Specialties Details Why Contact Info    follow up in urgent care or with your pcp        [facility] Emergency Medicine  If symptoms  worsen [address] [facility] [address]  [phone]             Michael Donnelly, ACNP  03/05/24 0843

## 2024-03-05 NOTE — PROGRESS NOTES
"Subjective:       Patient ID: Christ Arambula is a 43 y.o. female.    Vitals:  height is 5' 7" (1.702 m) and weight is 59.4 kg (130 lb 15.3 oz). Her temperature is 98.8 °F (37.1 °C). Her blood pressure is 127/77 and her pulse is 77. Her respiration is 16 and oxygen saturation is 100%.     Chief Complaint: Medication Refill (periactin) and Vaginal Discharge (X 4days )    Patient with several days of malodorous vaginal discharge, no abdominal or pelvic pain, no back or flank pain.  No fever, no rash.  No joint symptoms.  No sore throat.  Sexually active.  Requesting STI testing.    Also requesting a refill on Periactin that she uses for appetite stimulation.  Followed by PCP.    All other systems are negative    Chart reviewed    Objective:   Physical Exam   Constitutional:  Non-toxic appearance. She does not appear ill. No distress.   Neck: Neck supple.   Abdominal: Normal appearance. She exhibits no distension. flat abdomen There is no abdominal tenderness. There is no rebound, no guarding, no left CVA tenderness and no right CVA tenderness.   Neurological: no focal deficit. She is alert.   Skin: Skin is warm, dry and not diaphoretic.   Psychiatric: Her behavior is normal. Mood normal.   Nursing note and vitals reviewed.        Assessment:     1. Vaginal discharge    2. Decreased appetite            Plan:   Will notify of any positive results on STI testing.  Patient can also follow results on the patient portal.  Safe sex precautions.  Agreed to refill Periactin.  Follow-up with PCP.      Vaginal discharge  -     T.vaginalisisc, Amplified RNA  -     Chlamydia/GC, PCR  -     Wet Prep, Genital    Decreased appetite  -     cyproheptadine (PERIACTIN) 4 mg tablet; Take 1 tablet (4 mg total) by mouth 3 (three) times daily as needed (appetite).  Dispense: 60 tablet; Refill: 0        Please note: This chart was completed via voice to text dictation. It may contain typographical/word recognition errors. If there are " any questions, please contact the provider for final clarification.

## 2024-03-06 LAB
SPECIMEN SOURCE: NORMAL
T VAGINALIS RRNA SPEC QL NAA+PROBE: NEGATIVE

## 2024-05-31 ENCOUNTER — TELEPHONE (OUTPATIENT)
Dept: INTERNAL MEDICINE | Facility: CLINIC | Age: 44
End: 2024-05-31
Payer: MEDICAID

## 2024-05-31 DIAGNOSIS — R63.0 DECREASED APPETITE: ICD-10-CM

## 2024-05-31 RX ORDER — CYPROHEPTADINE HYDROCHLORIDE 4 MG/1
4 TABLET ORAL 3 TIMES DAILY PRN
Qty: 60 TABLET | Refills: 0 | Status: SHIPPED | OUTPATIENT
Start: 2024-05-31

## 2024-07-03 ENCOUNTER — HOSPITAL ENCOUNTER (OUTPATIENT)
Dept: RADIOLOGY | Facility: HOSPITAL | Age: 44
Discharge: HOME | End: 2024-07-03
Attending: NURSE PRACTITIONER
Payer: MEDICAID

## 2024-07-03 ENCOUNTER — OFFICE VISIT (OUTPATIENT)
Dept: INTERNAL MEDICINE | Facility: CLINIC | Age: 44
End: 2024-07-03
Payer: MEDICAID

## 2024-07-03 VITALS
HEIGHT: 67 IN | DIASTOLIC BLOOD PRESSURE: 73 MMHG | SYSTOLIC BLOOD PRESSURE: 126 MMHG | WEIGHT: 142.63 LBS | TEMPERATURE: 99 F | BODY MASS INDEX: 22.39 KG/M2 | RESPIRATION RATE: 16 BRPM | HEART RATE: 61 BPM

## 2024-07-03 DIAGNOSIS — J45.40 MODERATE PERSISTENT ASTHMA, UNSPECIFIED WHETHER COMPLICATED: ICD-10-CM

## 2024-07-03 DIAGNOSIS — F33.1 MODERATE EPISODE OF RECURRENT MAJOR DEPRESSIVE DISORDER: ICD-10-CM

## 2024-07-03 DIAGNOSIS — G62.9 NEUROPATHY: ICD-10-CM

## 2024-07-03 DIAGNOSIS — M54.41 BILATERAL LOW BACK PAIN WITH BILATERAL SCIATICA, UNSPECIFIED CHRONICITY: Primary | ICD-10-CM

## 2024-07-03 DIAGNOSIS — F41.1 GAD (GENERALIZED ANXIETY DISORDER): ICD-10-CM

## 2024-07-03 DIAGNOSIS — M54.42 BILATERAL LOW BACK PAIN WITH BILATERAL SCIATICA, UNSPECIFIED CHRONICITY: Primary | ICD-10-CM

## 2024-07-03 DIAGNOSIS — R30.0 DYSURIA: ICD-10-CM

## 2024-07-03 DIAGNOSIS — J30.2 SEASONAL ALLERGIC RHINITIS, UNSPECIFIED TRIGGER: ICD-10-CM

## 2024-07-03 DIAGNOSIS — R63.0 DECREASED APPETITE: ICD-10-CM

## 2024-07-03 PROCEDURE — 72100 X-RAY EXAM L-S SPINE 2/3 VWS: CPT | Mod: TC

## 2024-07-03 PROCEDURE — 99215 OFFICE O/P EST HI 40 MIN: CPT | Mod: PBBFAC,25 | Performed by: NURSE PRACTITIONER

## 2024-07-03 RX ORDER — CYPROHEPTADINE HYDROCHLORIDE 4 MG/1
4 TABLET ORAL 3 TIMES DAILY PRN
Qty: 60 TABLET | Refills: 1 | Status: SHIPPED | OUTPATIENT
Start: 2024-07-03

## 2024-07-03 NOTE — ASSESSMENT & PLAN NOTE
Continue current plan of care with duloxetine.  She denies any active depression symptoms.  Denies any suicidal or homicidal ideations.

## 2024-07-03 NOTE — ASSESSMENT & PLAN NOTE
Asthma symptoms are well-controlled with current plan of care.  Using Trelegy inhaler daily and has rescue inhaler as needed.  Denies any recent exacerbation symptoms.  Denies any nighttime coughing.  Advised of smoking cessation importance.
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
## 2024-07-03 NOTE — ASSESSMENT & PLAN NOTE
Continue duloxetine.  Symptoms are fairly well-controlled.  She denies any suicidal or homicidal ideations.
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
## 2024-07-05 ENCOUNTER — PATIENT MESSAGE (OUTPATIENT)
Dept: INTERNAL MEDICINE | Facility: CLINIC | Age: 44
End: 2024-07-05
Payer: MEDICAID

## 2024-08-14 ENCOUNTER — OFFICE VISIT (OUTPATIENT)
Dept: URGENT CARE | Facility: CLINIC | Age: 44
End: 2024-08-14
Payer: MEDICAID

## 2024-08-14 VITALS
BODY MASS INDEX: 22.07 KG/M2 | OXYGEN SATURATION: 100 % | WEIGHT: 140.63 LBS | HEIGHT: 67 IN | RESPIRATION RATE: 18 BRPM | HEART RATE: 61 BPM | TEMPERATURE: 99 F | DIASTOLIC BLOOD PRESSURE: 74 MMHG | SYSTOLIC BLOOD PRESSURE: 128 MMHG

## 2024-08-14 DIAGNOSIS — R09.89 SYMPTOMS OF UPPER RESPIRATORY INFECTION (URI): ICD-10-CM

## 2024-08-14 DIAGNOSIS — K08.89 PAIN, DENTAL: Primary | ICD-10-CM

## 2024-08-14 LAB
CTP QC/QA: YES
SARS-COV-2 RDRP RESP QL NAA+PROBE: NEGATIVE

## 2024-08-14 PROCEDURE — 99213 OFFICE O/P EST LOW 20 MIN: CPT | Mod: S$PBB,,,

## 2024-08-14 PROCEDURE — 87635 SARS-COV-2 COVID-19 AMP PRB: CPT | Mod: PBBFAC

## 2024-08-14 PROCEDURE — 99215 OFFICE O/P EST HI 40 MIN: CPT | Mod: PBBFAC

## 2024-08-14 PROCEDURE — 63600175 PHARM REV CODE 636 W HCPCS

## 2024-08-14 RX ORDER — KETOROLAC TROMETHAMINE 30 MG/ML
60 INJECTION, SOLUTION INTRAMUSCULAR; INTRAVENOUS
Status: COMPLETED | OUTPATIENT
Start: 2024-08-14 | End: 2024-08-14

## 2024-08-14 RX ORDER — IBUPROFEN 800 MG/1
800 TABLET ORAL 3 TIMES DAILY
Qty: 30 TABLET | Refills: 0 | Status: SHIPPED | OUTPATIENT
Start: 2024-08-14 | End: 2024-08-24

## 2024-08-14 RX ORDER — AMOXICILLIN 875 MG/1
875 TABLET, FILM COATED ORAL EVERY 12 HOURS
Qty: 20 TABLET | Refills: 0 | Status: SHIPPED | OUTPATIENT
Start: 2024-08-14 | End: 2024-08-24

## 2024-08-14 RX ADMIN — KETOROLAC TROMETHAMINE 60 MG: 30 INJECTION, SOLUTION INTRAMUSCULAR at 04:08

## 2024-08-14 NOTE — PATIENT INSTRUCTIONS
Take antibiotics as directed. Do not stop taking them just because you feel better. You need to take the full course of antibiotics  Brush and floss gently.  Reduce pain and swelling in your face and jaw by putting ice or a cold pack on the outside of your cheek. Do this for 10 to 20 minutes at a time. Put a thin cloth between the ice and your skin.  Avoid using tobacco products. Tobacco and nicotine slow your ability to heal.  Take pain medicines exactly as directed.  If the doctor gave you a prescription medicine for pain, take it as prescribed.  If you are not taking a prescription pain medicine, ask your doctor if you can take an over-the-counter medicine such as acetaminophen (Tylenol) or ibuprofen (Advil or Motrin). Be safe with medicines. Read and follow all instructions on the label.  seek immediate medical care if:  You have worsening signs of infection, such as:  Increased pain, swelling, warmth, or redness.  Red streaks leading from the area.  Pus draining from the area.  A fever.  You do not get better as expected

## 2024-08-14 NOTE — PROGRESS NOTES
"Subjective:       Patient ID: Christ Arambula is a 43 y.o. female.    Vitals:  height is 5' 7" (1.702 m) and weight is 63.8 kg (140 lb 9.6 oz). Her temperature is 98.8 °F (37.1 °C). Her blood pressure is 128/74 and her pulse is 61. Her respiration is 18 and oxygen saturation is 100%.     Chief Complaint: URI (Sore throat, fatigue, HA, facial pain, otalgia since this AM. ) and Dental Pain (Lt dental pain since yesterday. )    43-year-old female presents to the clinic with complaints of facial pain, ear pain, and dental pain that started this morning.  Patient reports she has dental pain on her left upper jaw and reports that she has been needing to have the tooth pulled for quite a while.  Patient states she is established at [facility] where she gets her dental work done and we will call to make a dental appointment as soon as possible.  Discussed importance of medication compliance, plan of care, and need for follow-up with dentist and patient verbalizes understanding.  Patient denies fever, nausea, vomiting, diarrhea, headache, abdominal pain, nausea.    All other systems are negative    Chart reviewed    Objective:   Physical Exam   Constitutional: She appears well-developed.  Non-toxic appearance. She does not appear ill. No distress.   HENT:   Head: Normocephalic and atraumatic.   Ears:   Right Ear: Hearing, tympanic membrane, external ear and ear canal normal.   Left Ear: Hearing, tympanic membrane, external ear and ear canal normal.   Nose: Nose normal. No purulent discharge. Right sinus exhibits no maxillary sinus tenderness and no frontal sinus tenderness. Left sinus exhibits no maxillary sinus tenderness and no frontal sinus tenderness.   Mouth/Throat: Uvula is midline, oropharynx is clear and moist and mucous membranes are normal. Abnormal dentition. Dental abscesses and dental caries present.   Eyes: Right eye exhibits no discharge. Left eye exhibits no discharge. Extraocular movement " intact   Neck: Neck supple. No neck rigidity present.   Cardiovascular: Regular rhythm and normal heart sounds.   Pulmonary/Chest: Effort normal and breath sounds normal. No respiratory distress. She has no wheezes. She has no rhonchi. She has no rales.   Abdominal: Bowel sounds are normal.   Lymphadenopathy:     She has no cervical adenopathy.   Neurological: She is alert.   Skin: Skin is warm, dry and not diaphoretic.   Psychiatric: Her behavior is normal.   Nursing note and vitals reviewed.      Assessment:     1. Pain, dental    2. Symptoms of upper respiratory infection (URI)          Results for orders placed or performed in visit on 08/14/24   POCT COVID-19 Rapid Screening   Result Value Ref Range    POC Rapid COVID Negative Negative     Acceptable Yes        Plan:     Take antibiotics as directed. Do not stop taking them just because you feel better. You need to take the full course of antibiotics  Brush and floss gently.  Reduce pain and swelling in your face and jaw by putting ice or a cold pack on the outside of your cheek. Do this for 10 to 20 minutes at a time. Put a thin cloth between the ice and your skin.  Avoid using tobacco products. Tobacco and nicotine slow your ability to heal.  Take pain medicines exactly as directed.  If the doctor gave you a prescription medicine for pain, take it as prescribed.  If you are not taking a prescription pain medicine, ask your doctor if you can take an over-the-counter medicine such as acetaminophen (Tylenol) or ibuprofen (Advil or Motrin). Be safe with medicines. Read and follow all instructions on the label.  seek immediate medical care if:  You have worsening signs of infection, such as:  Increased pain, swelling, warmth, or redness.  Red streaks leading from the area.  Pus draining from the area.  A fever.  You do not get better as expected      Pain, dental  -     ketorolac injection 60 mg  -     (Magic mouthwash) 1:1:1  diphenhydrAMINE(Benadryl) 12.5mg/5ml liq, aluminum & magnesium hydroxide-simethicone (Maalox), LIDOcaine viscous 2%; Swish and spit 5 mLs every 4 (four) hours as needed (dental pain). for mouth sores  Dispense: 60 mL; Refill: 2  -     amoxicillin (AMOXIL) 875 MG tablet; Take 1 tablet (875 mg total) by mouth every 12 (twelve) hours. for 10 days  Dispense: 20 tablet; Refill: 0  -     ibuprofen (ADVIL,MOTRIN) 800 MG tablet; Take 1 tablet (800 mg total) by mouth 3 (three) times daily. for 10 days  Dispense: 30 tablet; Refill: 0    Symptoms of upper respiratory infection (URI)  -     POCT COVID-19 Rapid Screening        Please note: This chart was completed via voice to text dictation. It may contain typographical/word recognition errors. If there are any questions, please contact the provider for final clarification.

## 2024-09-18 ENCOUNTER — OFFICE VISIT (OUTPATIENT)
Dept: GYNECOLOGY | Facility: CLINIC | Age: 44
End: 2024-09-18
Payer: MEDICAID

## 2024-09-18 VITALS
WEIGHT: 143.63 LBS | HEART RATE: 69 BPM | RESPIRATION RATE: 18 BRPM | BODY MASS INDEX: 22.54 KG/M2 | OXYGEN SATURATION: 100 % | TEMPERATURE: 99 F | SYSTOLIC BLOOD PRESSURE: 134 MMHG | DIASTOLIC BLOOD PRESSURE: 73 MMHG | HEIGHT: 67 IN

## 2024-09-18 DIAGNOSIS — R10.2 PELVIC PAIN: ICD-10-CM

## 2024-09-18 DIAGNOSIS — Z11.3 SCREENING FOR STD (SEXUALLY TRANSMITTED DISEASE): ICD-10-CM

## 2024-09-18 DIAGNOSIS — Z12.31 ENCOUNTER FOR SCREENING MAMMOGRAM FOR BREAST CANCER: ICD-10-CM

## 2024-09-18 DIAGNOSIS — R23.2 HOT FLASHES: ICD-10-CM

## 2024-09-18 DIAGNOSIS — Z12.4 ENCOUNTER FOR PAPANICOLAOU SMEAR FOR CERVICAL CANCER SCREENING: Primary | ICD-10-CM

## 2024-09-18 LAB
B-HCG UR QL: NEGATIVE
BILIRUB UR QL STRIP: NEGATIVE
C TRACH DNA SPEC QL NAA+PROBE: NOT DETECTED
CLUE CELLS VAG QL WET PREP: NORMAL
CTP QC/QA: YES
GLUCOSE UR QL STRIP: NEGATIVE
KETONES UR QL STRIP: NEGATIVE
LEUKOCYTE ESTERASE UR QL STRIP: NEGATIVE
N GONORRHOEA DNA SPEC QL NAA+PROBE: NOT DETECTED
PH, POC UA: 5.5
POC BLOOD, URINE: NEGATIVE
POC NITRATES, URINE: NEGATIVE
PROT UR QL STRIP: NEGATIVE
SOURCE (OHS): NORMAL
SP GR UR STRIP: >=1.03 (ref 1–1.03)
T VAGINALIS VAG QL WET PREP: NORMAL
UROBILINOGEN UR STRIP-ACNC: 0.2 (ref 0.1–1.1)
WBC #/AREA VAG WET PREP: NORMAL
YEAST SPEC QL WET PREP: NORMAL

## 2024-09-18 PROCEDURE — 87491 CHLMYD TRACH DNA AMP PROBE: CPT

## 2024-09-18 PROCEDURE — 3078F DIAST BP <80 MM HG: CPT | Mod: CPTII,,,

## 2024-09-18 PROCEDURE — 81003 URINALYSIS AUTO W/O SCOPE: CPT | Mod: PBBFAC

## 2024-09-18 PROCEDURE — 1159F MED LIST DOCD IN RCRD: CPT | Mod: CPTII,,,

## 2024-09-18 PROCEDURE — 87210 SMEAR WET MOUNT SALINE/INK: CPT

## 2024-09-18 PROCEDURE — 99214 OFFICE O/P EST MOD 30 MIN: CPT | Mod: PBBFAC

## 2024-09-18 PROCEDURE — 81025 URINE PREGNANCY TEST: CPT | Mod: PBBFAC

## 2024-09-18 PROCEDURE — 99213 OFFICE O/P EST LOW 20 MIN: CPT | Mod: 25,S$PBB,,

## 2024-09-18 PROCEDURE — 3008F BODY MASS INDEX DOCD: CPT | Mod: CPTII,,,

## 2024-09-18 PROCEDURE — 99386 PREV VISIT NEW AGE 40-64: CPT | Mod: S$PBB,,,

## 2024-09-18 PROCEDURE — 3075F SYST BP GE 130 - 139MM HG: CPT | Mod: CPTII,,,

## 2024-09-18 PROCEDURE — 87591 N.GONORRHOEAE DNA AMP PROB: CPT

## 2024-09-18 NOTE — PROGRESS NOTES
[facility] -  Gynecology / Women's Health Clinic     Subjective:      Patient ID: Christ Arambula is a 43 y.o. female.    Chief Complaint:  Gynecologic Exam      History of Present Illness:  The patient  here for annual exam. Her LMP was 24. Period last 4 days and changes pads 3-4x/day, cycles manageable and regular monthly. Denies history of abnormal paps. MG- 8/3/23 & BIRADS 1. Denies breast or urinary complaints. Denies abnormal bleeding or discharge. Pt reports no STIs in the past and no concerns. Contraception consist of condoms, admits not sexually active regularly. Hx of Depo use, stopped d/t prolonged bleeding with use. Admits tobacco use. Denies fly hx of breast, ovarian, uterine or colon cancer.     The patient presents to the clinic with c/o pelvic pain, occ at other times than during cycle. Admits slight pain, denies medication use for pain. Pt admits hx of ovarian cyst.    GYN & OB History:  Patient's last menstrual period was 2024 (approximate).     OB History    Para Term  AB Living   4 3 3 0 1 0   SAB IAB Ectopic Multiple Live Births   1 0 0   0      # Outcome Date GA Lbr Demetri/2nd Weight Sex Type Anes PTL Lv   4 Term      Vag-Spont      3 SAB            2 Term      Vag-Spont      1 Term      Vag-Spont          Past Medical History:   Diagnosis Date    Asthma     Pancreatitis         Past Surgical History:   Procedure Laterality Date    FOREIGN BODY REMOVAL Left     FOOT        Social History     Tobacco Use    Smoking status: Every Day     Current packs/day: 0.00     Average packs/day: 1 pack/day for 25.8 years (25.8 ttl pk-yrs)     Types: Cigarettes     Start date: 1997     Last attempt to quit: 2023     Years since quittin.1     Passive exposure: Never    Smokeless tobacco: Not on file    Tobacco comments:     I smoke  to much wanna stop but its hard.   Substance and Sexual Activity    Alcohol use: Not Currently     Comment:  "Occasionally    Drug use: Yes     Types: Marijuana     Comment: last used early Jan 2020    Sexual activity: Not Currently     Partners: Male     Birth control/protection: Condom        Current Outpatient Medications   Medication Instructions    albuterol (VENTOLIN HFA) 90 mcg/actuation inhaler 2 puffs, Inhalation, Every 6 hours PRN, Rescue    budesonide-formoterol 80-4.5 mcg (SYMBICORT) 80-4.5 mcg/actuation HFAA 2 puffs, Inhalation, 2 times daily, Controller    cyproheptadine (PERIACTIN) 4 mg, Oral, 3 times daily PRN    DULoxetine (CYMBALTA) 30 mg, Oral, Daily    ferrous sulfate 325 (65 FE) MG EC tablet No dose, route, or frequency recorded.    fluticasone-umeclidin-vilanter (TRELEGY ELLIPTA) 200-62.5-25 mcg inhaler 1 puff, Inhalation, Daily    meclizine (ANTIVERT) 25 mg, Oral, 3 times daily PRN    nitroGLYCERIN (NITROSTAT) 0.4 mg, Sublingual, Every 5 min PRN    promethazine-dextromethorphan (PROMETHAZINE-DM) 6.25-15 mg/5 mL Syrp 5 mLs, Every 6 hours PRN    sumatriptan (IMITREX) 50 mg, Oral, 3 times daily PRN    traZODone (DESYREL) 25-50 mg, Oral, Nightly PRN       Review of patient's allergies indicates:  No Known Allergies      Review of Systems:  Review of Systems  Negative except for pertinent findings for positives per HPI.     Objective:     Physical Exam   Visit Vitals  /73   Pulse 69   Temp 98.7 °F (37.1 °C) (Oral)   Resp 18   Ht 5' 7" (1.702 m)   Wt 65.1 kg (143 lb 9.6 oz)   LMP 09/07/2024 (Approximate)   SpO2 100%   BMI 22.49 kg/m²       GENERAL: Well-developed female. No acute distress.    SKIN: Normal to inspection, warm and intact.  BREASTS: No rashes or erythema. No masses, lumps, discharge, tenderness.  VULVA: General appearance normal; external genitalia with no lesions or erythema.  VAGINA: Mucosa/vaginal vault pink, no abnormal discharge or lesions.  CERVIX: Pink, parous appearing os, nabothian cyst noted, no erythema or abnormal discharge.  BIMANUAL EXAM: reveals a 8 week-sized uterus. The " uterus is non tender. Bilateral adnexa reveal no tenderness.  PSYCHIATRIC: Patient is oriented to person, place, and time. Mood and affect are normal.    Assessment:       ICD-10-CM ICD-9-CM   1. Encounter for Papanicolaou smear for cervical cancer screening  Z12.4 V76.2   2. Screening for STD (sexually transmitted disease)  Z11.3 V74.5   3. Pelvic pain  R10.2 [id number]   4. Encounter for screening mammogram for breast cancer  Z12.31 V76.12   5. Hot flashes  R23.2 782.62       Plan:     1. Encounter for Papanicolaou smear for cervical cancer screening  -     Liquid-Based Pap Smear, Screening    2. Screening for STD (sexually transmitted disease)  -     Chlamydia/GC, PCR  -     Wet Prep, Genital    3. Pelvic pain  -     Chlamydia/GC, PCR  -     Wet Prep, Genital  -     US Pelvis Comp with Transvag NON-OB (xpd; Future; Expected date: 09/18/2024  -     POCT urine pregnancy  -     POCT Urinalysis, Dipstick, Automated, W/O Scope    4. Encounter for screening mammogram for breast cancer  -     Mammo Digital Screening Bilat w/ Jose; Future; Expected date: 09/18/2024    5. Hot flashes    Pap today  MG ordered  STD testing    Pelvic pain - pelvic uls, G/C and Wet prep, Urine dip - neg, UPT - neg    Encouraged well balanced diet and exercise 3-4x per week; use of handheld and fan at bedside, keep home cooled. Ice packs. Encouraged wearing layers to remove as needed, light colored clothes and linen fabrics. Avoid spicy foods and excessive caffeine use. Maintain healthy body weight. Quit smoking.  Exercise, yoga, meditation, paced respirations are all good coping techniques.   Discussed medication options including OTC regimens (Estroven, Amberen, black cohosh), HRT, SSRI/SNRI. Pt stated will try lifestyle changes, willing to quit smoking in near future, will f/u as needed, hot flashes are manageable.    Smoking cessation counseling provided. Instructed on smoking cessation program through [facility] and pharmacological interventions to  aid in cessation.    Track cycles, 1 full year without cycle will be considered Post Menopausal  Call with any GYN concerns  Follow up in about 1 year (around 9/18/2025) for Annual.

## 2024-11-15 ENCOUNTER — HOSPITAL ENCOUNTER (EMERGENCY)
Facility: HOSPITAL | Age: 44
Discharge: HOME | End: 2024-11-15
Attending: STUDENT IN AN ORGANIZED HEALTH CARE EDUCATION/TRAINING PROGRAM
Payer: MEDICAID

## 2024-11-15 VITALS
WEIGHT: 143.31 LBS | TEMPERATURE: 98 F | DIASTOLIC BLOOD PRESSURE: 82 MMHG | BODY MASS INDEX: 22.49 KG/M2 | HEIGHT: 67 IN | HEART RATE: 77 BPM | OXYGEN SATURATION: 100 % | SYSTOLIC BLOOD PRESSURE: 129 MMHG | RESPIRATION RATE: 18 BRPM

## 2024-11-15 DIAGNOSIS — H10.31 ACUTE CONJUNCTIVITIS OF RIGHT EYE, UNSPECIFIED ACUTE CONJUNCTIVITIS TYPE: Primary | ICD-10-CM

## 2024-11-15 PROCEDURE — 99283 EMERGENCY DEPT VISIT LOW MDM: CPT

## 2024-11-15 PROCEDURE — 25000003 PHARM REV CODE 250: Performed by: STUDENT IN AN ORGANIZED HEALTH CARE EDUCATION/TRAINING PROGRAM

## 2024-11-15 RX ORDER — MOXIFLOXACIN 5 MG/ML
1 SOLUTION/ DROPS OPHTHALMIC 3 TIMES DAILY
Qty: 3 ML | Refills: 0 | Status: SHIPPED | OUTPATIENT
Start: 2024-11-15 | End: 2024-11-22

## 2024-11-15 RX ORDER — PROPARACAINE HYDROCHLORIDE 5 MG/ML
1 SOLUTION/ DROPS OPHTHALMIC
Status: DISCONTINUED | OUTPATIENT
Start: 2024-11-15 | End: 2024-11-15 | Stop reason: HOSPADM

## 2024-11-15 NOTE — ED PROVIDER NOTES
Encounter Date: 11/15/2024       History     Chief Complaint   Patient presents with    Eye Problem     Pt present with right eye redness x 2 days. Pt states has been having pain and draining from right eye      Patient presents to the emergency department due to a red eye.  She states for the last few days it has been red, tearing, and burning.  She does wear contacts and has had a previous history of an ulcer in that eye before.  She denies any fevers.  No changes in her vision.  She is having some photophobia.    The history is provided by the patient.     Review of patient's allergies indicates:  No Known Allergies  Past Medical History:   Diagnosis Date    Asthma     Pancreatitis      Past Surgical History:   Procedure Laterality Date    FOREIGN BODY REMOVAL Left     FOOT     Family History   Problem Relation Name Age of Onset    Lincoln's disease Father      Schizophrenia Father      Stroke Maternal Aunt Lani     Heart disease Maternal Grandmother Jacquelyn Arambula     Diabetes Maternal Grandmother Jacquelyn Arambula     Arthritis Maternal Grandfather Vincent Arambula     Diabetes Maternal Grandfather Vincent [place]     Asthma Maternal Grandfather Vincent Tyesha      Social History     Tobacco Use    Smoking status: Every Day     Current packs/day: 0.00     Average packs/day: 1 pack/day for 25.8 years (25.8 ttl pk-yrs)     Types: Cigarettes     Start date: 1997     Last attempt to quit: 2023     Years since quittin.3     Passive exposure: Never    Tobacco comments:     I smoke  to much wanna stop but its hard.   Substance Use Topics    Alcohol use: Not Currently     Comment: Occasionally    Drug use: Yes     Types: Marijuana     Comment: last used early 2020     Review of Systems   Constitutional:  Negative for chills and fever.   HENT:  Negative for congestion and sore throat.    Eyes:  Positive for photophobia, redness and itching.   Respiratory:  Negative for cough and shortness of  breath.    Cardiovascular:  Negative for chest pain and palpitations.   Gastrointestinal:  Negative for abdominal pain and nausea.   Genitourinary:  Negative for dysuria and hematuria.   Musculoskeletal:  Negative for arthralgias and myalgias.   Neurological:  Negative for dizziness and weakness.       Physical Exam     Initial Vitals [11/15/24 0237]   BP Pulse Resp Temp SpO2   129/82 77 18 98.2 °F (36.8 °C) 100 %      MAP       --         Physical Exam    Nursing note and vitals reviewed.  Constitutional: She appears well-developed and well-nourished.   HENT:   Head: Normocephalic and atraumatic.   Eyes: EOM are normal. Pupils are equal, round, and reactive to light.   Mild right-sided conjunctival injection, no appreciable foreign bodies   Neck: Neck supple.   Normal range of motion.  Cardiovascular:  Normal rate and regular rhythm.           Pulmonary/Chest: Breath sounds normal. No respiratory distress.   Abdominal: Abdomen is soft. There is no abdominal tenderness.   Musculoskeletal:         General: No edema. Normal range of motion.      Cervical back: Normal range of motion and neck supple.     Neurological: She is alert and oriented to person, place, and time.   Skin: Skin is warm and dry.         ED Course   Procedures  Labs Reviewed - No data to display       Imaging Results    None          Medications   proparacaine 0.5 % ophthalmic solution 1 drop (has no administration in time range)   fluorescein ophthalmic strip 1 each (has no administration in time range)     Medical Decision Making  Patient was declines any changes in visual acuity, no fevers, mild conjunctival injection no corneal clouding on physical exam, no obvious discharge.  Will place on Vigamox drops and referred to Ophthalmology.    Risk  Prescription drug management.                                      Clinical Impression:  Final diagnoses:  [H10.31] Acute conjunctivitis of right eye, unspecified acute conjunctivitis type (Primary)           ED Disposition Condition    Discharge Stable          ED Prescriptions       Medication Sig Dispense Start Date End Date Auth. Provider    moxifloxacin (VIGAMOX) 0.5 % ophthalmic solution Place 1 drop into the right eye 3 (three) times daily. for 7 days 3 mL 11/15/2024 11/22/2024 Ty Tsai MD          Follow-up Information       Follow up With Specialties Details Why Contact Info    [facility] Emergency Medicine Go to  If symptoms worsen [address] [facility] [id number] [phone]    Pierce Iraheta, P Family Medicine Call  As needed [address] [facility] [address]  [phone]               Ty Tsai MD  11/15/24 0447

## 2024-11-15 NOTE — Clinical Note
"Christ Arambula (Shalania) was seen and treated in our emergency department on 11/15/2024.  She may return to work on 11/18/2024.       If you have any questions or concerns, please don't hesitate to call.       RN    "

## 2024-11-29 ENCOUNTER — OFFICE VISIT (OUTPATIENT)
Dept: URGENT CARE | Facility: CLINIC | Age: 44
End: 2024-11-29
Payer: MEDICAID

## 2024-11-29 VITALS
BODY MASS INDEX: 23.84 KG/M2 | OXYGEN SATURATION: 100 % | TEMPERATURE: 98 F | HEART RATE: 90 BPM | HEIGHT: 66 IN | SYSTOLIC BLOOD PRESSURE: 138 MMHG | DIASTOLIC BLOOD PRESSURE: 87 MMHG | WEIGHT: 148.38 LBS | RESPIRATION RATE: 16 BRPM

## 2024-11-29 DIAGNOSIS — J30.2 SEASONAL ALLERGIC RHINITIS, UNSPECIFIED TRIGGER: ICD-10-CM

## 2024-11-29 DIAGNOSIS — N89.8 VAGINAL DISCHARGE: Primary | ICD-10-CM

## 2024-11-29 DIAGNOSIS — R10.9 ABDOMINAL PAIN, UNSPECIFIED ABDOMINAL LOCATION: ICD-10-CM

## 2024-11-29 LAB
B-HCG UR QL: NEGATIVE
BILIRUB UR QL STRIP: NEGATIVE
CLUE CELLS VAG QL WET PREP: ABNORMAL
CTP QC/QA: YES
GLUCOSE UR QL STRIP: NEGATIVE
KETONES UR QL STRIP: NEGATIVE
LEUKOCYTE ESTERASE UR QL STRIP: NEGATIVE
PH, POC UA: 7
POC BLOOD, URINE: NEGATIVE
POC NITRATES, URINE: NEGATIVE
PROT UR QL STRIP: NEGATIVE
SP GR UR STRIP: 1.02 (ref 1–1.03)
T VAGINALIS VAG QL WET PREP: ABNORMAL
UROBILINOGEN UR STRIP-ACNC: NORMAL (ref 0.1–1.1)
WBC #/AREA VAG WET PREP: ABNORMAL
YEAST SPEC QL WET PREP: ABNORMAL

## 2024-11-29 PROCEDURE — 87210 SMEAR WET MOUNT SALINE/INK: CPT | Performed by: FAMILY MEDICINE

## 2024-11-29 PROCEDURE — 99215 OFFICE O/P EST HI 40 MIN: CPT | Mod: PBBFAC | Performed by: FAMILY MEDICINE

## 2024-11-29 RX ORDER — CYPROHEPTADINE HYDROCHLORIDE 4 MG/1
4 TABLET ORAL 3 TIMES DAILY PRN
Qty: 60 TABLET | Refills: 1 | Status: SHIPPED | OUTPATIENT
Start: 2024-11-29

## 2024-11-29 NOTE — PROGRESS NOTES
"Subjective:       Patient ID: Christ Arambula is a 43 y.o. female.    Vitals:  height is 5' 6" (1.676 m) and weight is 67.3 kg (148 lb 5.9 oz). Her temperature is 98.4 °F (36.9 °C). Her blood pressure is 138/87 and her pulse is 90. Her respiration is 16 and oxygen saturation is 100%.     Chief Complaint: Abdominal Pain (With vaginal odor x 1wk )    Patient actually denies abdominal pain.  Complaining mostly of a vaginal odor for several days.  Has a new sexual partner.  No back or flank pain.  No rash or joint pain.  No sore throat.    All other systems are negative    Chart reviewed    Objective:   Physical Exam   Constitutional:  Non-toxic appearance. She does not appear ill. No distress.   Neck: Neck supple.   Abdominal: Normal appearance. She exhibits no distension. flat abdomen There is no abdominal tenderness. There is no rebound, no guarding, no left CVA tenderness and no right CVA tenderness.   Neurological: no focal deficit. She is alert.   Skin: Skin is warm, dry and not diaphoretic.   Psychiatric: Her behavior is normal. Mood normal.   Nursing note and vitals reviewed.        Assessment:     1. Vaginal discharge    2. Abdominal pain, unspecified abdominal location    3. Seasonal allergic rhinitis, unspecified trigger            Plan:   Will notify patient of any positive results on testing and treat accordingly.  Patient can follow all test results on the patient portal.     Please practice safe sex and read patient education material.  Avoid sexual activity until all results are known. Follow up with PCP or return to urgent care if symptoms are not improving in several days. Go to the ER if symptoms worsen or new symptoms develop.      Vaginal discharge  -     Sexually-Transmitted Infections (STIs) Increased Risk Panel  -     Wet Prep, Genital    Abdominal pain, unspecified abdominal location  -     POCT urine pregnancy  -     POCT Urinalysis, Dipstick, Automated, W/O Scope    Seasonal allergic " rhinitis, unspecified trigger  -     cyproheptadine (PERIACTIN) 4 mg tablet; Take 1 tablet (4 mg total) by mouth 3 (three) times daily as needed (allergic rhinitis).  Dispense: 60 tablet; Refill: 1        Please note: This chart was completed via voice to text dictation. It may contain typographical/word recognition errors. If there are any questions, please contact the provider for final clarification.

## 2024-12-02 DIAGNOSIS — N76.0 BV (BACTERIAL VAGINOSIS): Primary | ICD-10-CM

## 2024-12-02 DIAGNOSIS — B96.89 BV (BACTERIAL VAGINOSIS): Primary | ICD-10-CM

## 2024-12-02 RX ORDER — METRONIDAZOLE 500 MG/1
500 TABLET ORAL EVERY 12 HOURS
Qty: 14 TABLET | Refills: 0 | Status: SHIPPED | OUTPATIENT
Start: 2024-12-02 | End: 2024-12-09

## 2024-12-03 ENCOUNTER — TELEPHONE (OUTPATIENT)
Dept: URGENT CARE | Facility: CLINIC | Age: 44
End: 2024-12-03
Payer: MEDICAID

## 2024-12-03 NOTE — TELEPHONE ENCOUNTER
----- Message from MARYSE Carmichael sent at 12/2/2024  9:12 AM CST -----  Your vaginal swab test indicates positive clue cells which may be an indicator for bacterial vaginosis. A prescription for Flagyl has been sent to listed pharmacy. Please avoid alcohol while taking Flagyl, may cause extreme side effects. Avoid any sexual activity until prescription has been fully completed. Follow up with GYN with additional concerns.

## 2025-01-04 ENCOUNTER — OFFICE VISIT (OUTPATIENT)
Dept: URGENT CARE | Facility: CLINIC | Age: 45
End: 2025-01-04
Payer: MEDICAID

## 2025-01-04 VITALS
OXYGEN SATURATION: 98 % | HEART RATE: 72 BPM | RESPIRATION RATE: 18 BRPM | HEIGHT: 66 IN | WEIGHT: 145.38 LBS | BODY MASS INDEX: 23.36 KG/M2 | TEMPERATURE: 98 F | SYSTOLIC BLOOD PRESSURE: 138 MMHG | DIASTOLIC BLOOD PRESSURE: 74 MMHG

## 2025-01-04 DIAGNOSIS — M79.602 PAIN OF LEFT UPPER EXTREMITY: Primary | ICD-10-CM

## 2025-01-04 PROCEDURE — 63600175 PHARM REV CODE 636 W HCPCS

## 2025-01-04 PROCEDURE — 99215 OFFICE O/P EST HI 40 MIN: CPT | Mod: PBBFAC

## 2025-01-04 PROCEDURE — 99213 OFFICE O/P EST LOW 20 MIN: CPT | Mod: S$PBB,,,

## 2025-01-04 RX ORDER — DEXAMETHASONE SODIUM PHOSPHATE 10 MG/ML
8 INJECTION INTRAMUSCULAR; INTRAVENOUS
Status: COMPLETED | OUTPATIENT
Start: 2025-01-04 | End: 2025-01-04

## 2025-01-04 RX ORDER — IBUPROFEN 800 MG/1
800 TABLET ORAL 3 TIMES DAILY PRN
Qty: 20 TABLET | Refills: 0 | Status: SHIPPED | OUTPATIENT
Start: 2025-01-04

## 2025-01-04 RX ADMIN — DEXAMETHASONE SODIUM PHOSPHATE 8 MG: 10 INJECTION INTRAMUSCULAR; INTRAVENOUS at 07:01

## 2025-01-05 NOTE — PROGRESS NOTES
"Subjective:       Patient ID: Christ Arambula is a 44 y.o. female.    Vitals:  height is 5' 6" (1.676 m) and weight is 66 kg (145 lb 6.4 oz). Her oral temperature is 98 °F (36.7 °C). Her blood pressure is 138/74 and her pulse is 72. Her respiration is 18 and oxygen saturation is 98%.     Chief Complaint: Arm Pain (Left arm pain that started earlier today while she was at work)    43 y/o AAF presents to  with daughter,  she reports LUE pain and stiffnes onset this afternoon, has taken Motrin 1 hr pta with no improvement. She denies any injuries or falls. She is right hand dominant.     Arm Pain   Pertinent negatives include no numbness.       Musculoskeletal:  Positive for joint pain, abnormal ROM of joint and muscle ache. Negative for trauma and joint swelling.   Neurological:  Negative for numbness and tingling.       Objective:      Physical Exam   Constitutional: She is oriented to person, place, and time. She is cooperative.   HENT:   Head: Normocephalic and atraumatic.   Pulmonary/Chest: Effort normal.   Abdominal: Normal appearance.   Musculoskeletal:         General: Tenderness present. No signs of injury.      Left upper arm: She exhibits tenderness. She exhibits no bony tenderness, no swelling, no edema, no deformity and no laceration.   Neurological: She is alert and oriented to person, place, and time. Gait normal. GCS eye subscore is 4. GCS verbal subscore is 5. GCS motor subscore is 6.   Skin: Skin is warm, dry and intact. Capillary refill takes less than 2 seconds.   Psychiatric: Her mood appears anxious.   Nursing note and vitals reviewed.        Assessment:       1. Pain of left upper extremity          Plan:    No xray warranted, patient has passive full ROM , will treat as MSK pain, encouraged to participate in limb stretches and exercises. Follow up with PCP if symptoms does not improve in one week.      Pain of left upper extremity  -     dexAMETHasone injection 8 mg  -     ibuprofen " (ADVIL,MOTRIN) 800 MG tablet; Take 1 tablet (800 mg total) by mouth 3 (three) times daily as needed for Pain.  Dispense: 20 tablet; Refill: 0